# Patient Record
Sex: FEMALE | Race: OTHER | HISPANIC OR LATINO | URBAN - METROPOLITAN AREA
[De-identification: names, ages, dates, MRNs, and addresses within clinical notes are randomized per-mention and may not be internally consistent; named-entity substitution may affect disease eponyms.]

---

## 2017-01-04 ENCOUNTER — EMERGENCY (EMERGENCY)
Facility: HOSPITAL | Age: 57
LOS: 1 days | Discharge: HOME | End: 2017-01-04
Attending: EMERGENCY MEDICINE
Payer: COMMERCIAL

## 2017-01-04 VITALS
HEART RATE: 99 BPM | RESPIRATION RATE: 18 BRPM | TEMPERATURE: 99 F | OXYGEN SATURATION: 97 % | SYSTOLIC BLOOD PRESSURE: 106 MMHG | DIASTOLIC BLOOD PRESSURE: 51 MMHG

## 2017-01-04 VITALS
WEIGHT: 139.99 LBS | SYSTOLIC BLOOD PRESSURE: 136 MMHG | DIASTOLIC BLOOD PRESSURE: 53 MMHG | OXYGEN SATURATION: 99 % | HEIGHT: 61 IN | TEMPERATURE: 102 F | RESPIRATION RATE: 18 BRPM | HEART RATE: 118 BPM

## 2017-01-04 LAB
ALBUMIN SERPL ELPH-MCNC: 3.5 G/DL — SIGNIFICANT CHANGE UP (ref 3.5–5)
ALP SERPL-CCNC: 155 U/L — HIGH (ref 40–120)
ALT FLD-CCNC: 136 U/L DA — HIGH (ref 10–60)
ANION GAP SERPL CALC-SCNC: 9 MMOL/L — SIGNIFICANT CHANGE UP (ref 5–17)
AST SERPL-CCNC: 78 U/L — HIGH (ref 10–40)
BASOPHILS # BLD AUTO: 0.1 K/UL — SIGNIFICANT CHANGE UP (ref 0–0.2)
BASOPHILS NFR BLD AUTO: 0.9 % — SIGNIFICANT CHANGE UP (ref 0–2)
BILIRUB SERPL-MCNC: 0.6 MG/DL — SIGNIFICANT CHANGE UP (ref 0.2–1.2)
BUN SERPL-MCNC: 9 MG/DL — SIGNIFICANT CHANGE UP (ref 7–18)
CALCIUM SERPL-MCNC: 8.8 MG/DL — SIGNIFICANT CHANGE UP (ref 8.4–10.5)
CHLORIDE SERPL-SCNC: 104 MMOL/L — SIGNIFICANT CHANGE UP (ref 96–108)
CO2 SERPL-SCNC: 23 MMOL/L — SIGNIFICANT CHANGE UP (ref 22–31)
CREAT SERPL-MCNC: 0.78 MG/DL — SIGNIFICANT CHANGE UP (ref 0.5–1.3)
EOSINOPHIL # BLD AUTO: 0 K/UL — SIGNIFICANT CHANGE UP (ref 0–0.5)
EOSINOPHIL NFR BLD AUTO: 0 % — SIGNIFICANT CHANGE UP (ref 0–6)
GLUCOSE SERPL-MCNC: 134 MG/DL — HIGH (ref 70–99)
HCT VFR BLD CALC: 38 % — SIGNIFICANT CHANGE UP (ref 34.5–45)
HGB BLD-MCNC: 12.8 G/DL — SIGNIFICANT CHANGE UP (ref 11.5–15.5)
LYMPHOCYTES # BLD AUTO: 1.1 K/UL — SIGNIFICANT CHANGE UP (ref 1–3.3)
LYMPHOCYTES # BLD AUTO: 9.8 % — LOW (ref 13–44)
MCHC RBC-ENTMCNC: 29.3 PG — SIGNIFICANT CHANGE UP (ref 27–34)
MCHC RBC-ENTMCNC: 33.6 GM/DL — SIGNIFICANT CHANGE UP (ref 32–36)
MCV RBC AUTO: 87 FL — SIGNIFICANT CHANGE UP (ref 80–100)
MONOCYTES # BLD AUTO: 0.8 K/UL — SIGNIFICANT CHANGE UP (ref 0–0.9)
MONOCYTES NFR BLD AUTO: 6.9 % — SIGNIFICANT CHANGE UP (ref 2–14)
NEUTROPHILS # BLD AUTO: 9.1 K/UL — HIGH (ref 1.8–7.4)
NEUTROPHILS NFR BLD AUTO: 82.4 % — HIGH (ref 43–77)
PLATELET # BLD AUTO: 291 K/UL — SIGNIFICANT CHANGE UP (ref 150–400)
POTASSIUM SERPL-MCNC: 3.3 MMOL/L — LOW (ref 3.5–5.3)
POTASSIUM SERPL-SCNC: 3.3 MMOL/L — LOW (ref 3.5–5.3)
PROT SERPL-MCNC: 7.9 G/DL — SIGNIFICANT CHANGE UP (ref 6–8.3)
RBC # BLD: 4.36 M/UL — SIGNIFICANT CHANGE UP (ref 3.8–5.2)
RBC # FLD: 12 % — SIGNIFICANT CHANGE UP (ref 10.3–14.5)
SODIUM SERPL-SCNC: 136 MMOL/L — SIGNIFICANT CHANGE UP (ref 135–145)
WBC # BLD: 11.1 K/UL — HIGH (ref 3.8–10.5)
WBC # FLD AUTO: 11.1 K/UL — HIGH (ref 3.8–10.5)

## 2017-01-04 PROCEDURE — 80053 COMPREHEN METABOLIC PANEL: CPT

## 2017-01-04 PROCEDURE — 71046 X-RAY EXAM CHEST 2 VIEWS: CPT

## 2017-01-04 PROCEDURE — 99285 EMERGENCY DEPT VISIT HI MDM: CPT

## 2017-01-04 PROCEDURE — 71020: CPT | Mod: 26

## 2017-01-04 PROCEDURE — 96374 THER/PROPH/DIAG INJ IV PUSH: CPT

## 2017-01-04 PROCEDURE — 99284 EMERGENCY DEPT VISIT MOD MDM: CPT | Mod: 25

## 2017-01-04 PROCEDURE — 85027 COMPLETE CBC AUTOMATED: CPT

## 2017-01-04 RX ORDER — KETOROLAC TROMETHAMINE 30 MG/ML
15 SYRINGE (ML) INJECTION ONCE
Qty: 0 | Refills: 0 | Status: DISCONTINUED | OUTPATIENT
Start: 2017-01-04 | End: 2017-01-04

## 2017-01-04 RX ORDER — ONDANSETRON 8 MG/1
1 TABLET, FILM COATED ORAL
Qty: 6 | Refills: 0
Start: 2017-01-04 | End: 2017-03-25

## 2017-01-04 RX ORDER — ONDANSETRON 8 MG/1
1 TABLET, FILM COATED ORAL
Qty: 6 | Refills: 0 | OUTPATIENT
Start: 2017-01-04 | End: 2017-01-06

## 2017-01-04 RX ORDER — ACETAMINOPHEN 500 MG
650 TABLET ORAL ONCE
Qty: 0 | Refills: 0 | Status: COMPLETED | OUTPATIENT
Start: 2017-01-04 | End: 2017-01-04

## 2017-01-04 RX ORDER — POTASSIUM CHLORIDE 20 MEQ
40 PACKET (EA) ORAL ONCE
Qty: 0 | Refills: 0 | Status: COMPLETED | OUTPATIENT
Start: 2017-01-04 | End: 2017-01-04

## 2017-01-04 RX ORDER — SODIUM CHLORIDE 9 MG/ML
1000 INJECTION INTRAMUSCULAR; INTRAVENOUS; SUBCUTANEOUS ONCE
Qty: 0 | Refills: 0 | Status: COMPLETED | OUTPATIENT
Start: 2017-01-04 | End: 2017-01-04

## 2017-01-04 RX ADMIN — Medication 15 MILLIGRAM(S): at 14:52

## 2017-01-04 RX ADMIN — Medication 15 MILLIGRAM(S): at 16:21

## 2017-01-04 RX ADMIN — Medication 75 MILLIGRAM(S): at 16:21

## 2017-01-04 RX ADMIN — Medication 40 MILLIEQUIVALENT(S): at 16:21

## 2017-01-04 RX ADMIN — Medication 650 MILLIGRAM(S): at 14:52

## 2017-01-04 RX ADMIN — SODIUM CHLORIDE 2000 MILLILITER(S): 9 INJECTION INTRAMUSCULAR; INTRAVENOUS; SUBCUTANEOUS at 14:53

## 2017-01-04 NOTE — ED PROVIDER NOTE - PROGRESS NOTE DETAILS
Pt and family members were informed likely flu. Pt was given tamiflu in ED and was stable to be discharge

## 2017-01-04 NOTE — ED PROVIDER NOTE - OBJECTIVE STATEMENT
57 y/o F pt with no significant PMHx presents to the ED c/o fever x yesterday. Pt also notes body aches. Pt  denies any other complaints at this time. NKDA.

## 2017-01-04 NOTE — ED PROVIDER NOTE - MEDICAL DECISION MAKING DETAILS
Pt presents to ED with fever and general malaise. Possible flu. Plan to obtain labs, treat with fluids and Tamiflu.

## 2017-01-08 DIAGNOSIS — J11.1 INFLUENZA DUE TO UNIDENTIFIED INFLUENZA VIRUS WITH OTHER RESPIRATORY MANIFESTATIONS: ICD-10-CM

## 2017-02-28 ENCOUNTER — EMERGENCY (EMERGENCY)
Facility: HOSPITAL | Age: 57
LOS: 1 days | Discharge: HOME | End: 2017-02-28
Attending: EMERGENCY MEDICINE
Payer: COMMERCIAL

## 2017-02-28 VITALS
DIASTOLIC BLOOD PRESSURE: 62 MMHG | OXYGEN SATURATION: 98 % | RESPIRATION RATE: 18 BRPM | TEMPERATURE: 98 F | SYSTOLIC BLOOD PRESSURE: 100 MMHG | HEART RATE: 81 BPM

## 2017-02-28 VITALS
OXYGEN SATURATION: 96 % | HEIGHT: 62 IN | RESPIRATION RATE: 18 BRPM | SYSTOLIC BLOOD PRESSURE: 126 MMHG | WEIGHT: 145.06 LBS | TEMPERATURE: 98 F | DIASTOLIC BLOOD PRESSURE: 64 MMHG | HEART RATE: 106 BPM

## 2017-02-28 DIAGNOSIS — K57.32 DIVERTICULITIS OF LARGE INTESTINE WITHOUT PERFORATION OR ABSCESS WITHOUT BLEEDING: ICD-10-CM

## 2017-02-28 DIAGNOSIS — Z90.49 ACQUIRED ABSENCE OF OTHER SPECIFIED PARTS OF DIGESTIVE TRACT: Chronic | ICD-10-CM

## 2017-02-28 DIAGNOSIS — Z98.890 OTHER SPECIFIED POSTPROCEDURAL STATES: ICD-10-CM

## 2017-02-28 DIAGNOSIS — Z90.49 ACQUIRED ABSENCE OF OTHER SPECIFIED PARTS OF DIGESTIVE TRACT: ICD-10-CM

## 2017-02-28 DIAGNOSIS — Z98.891 HISTORY OF UTERINE SCAR FROM PREVIOUS SURGERY: Chronic | ICD-10-CM

## 2017-02-28 LAB
ALBUMIN SERPL ELPH-MCNC: 3.6 G/DL — SIGNIFICANT CHANGE UP (ref 3.5–5)
ALLERGY+IMMUNOLOGY DIAG STUDY NOTE: SIGNIFICANT CHANGE UP
ALP SERPL-CCNC: 150 U/L — HIGH (ref 40–120)
ALT FLD-CCNC: 73 U/L DA — HIGH (ref 10–60)
ANION GAP SERPL CALC-SCNC: 9 MMOL/L — SIGNIFICANT CHANGE UP (ref 5–17)
AST SERPL-CCNC: 57 U/L — HIGH (ref 10–40)
BASOPHILS # BLD AUTO: 0.1 K/UL — SIGNIFICANT CHANGE UP (ref 0–0.2)
BASOPHILS NFR BLD AUTO: 1.3 % — SIGNIFICANT CHANGE UP (ref 0–2)
BILIRUB SERPL-MCNC: 0.9 MG/DL — SIGNIFICANT CHANGE UP (ref 0.2–1.2)
BUN SERPL-MCNC: 10 MG/DL — SIGNIFICANT CHANGE UP (ref 7–18)
CALCIUM SERPL-MCNC: 8.6 MG/DL — SIGNIFICANT CHANGE UP (ref 8.4–10.5)
CHLORIDE SERPL-SCNC: 102 MMOL/L — SIGNIFICANT CHANGE UP (ref 96–108)
CO2 SERPL-SCNC: 27 MMOL/L — SIGNIFICANT CHANGE UP (ref 22–31)
CREAT SERPL-MCNC: 0.73 MG/DL — SIGNIFICANT CHANGE UP (ref 0.5–1.3)
EOSINOPHIL # BLD AUTO: 0.1 K/UL — SIGNIFICANT CHANGE UP (ref 0–0.5)
EOSINOPHIL NFR BLD AUTO: 0.6 % — SIGNIFICANT CHANGE UP (ref 0–6)
GLUCOSE SERPL-MCNC: 100 MG/DL — HIGH (ref 70–99)
HCG SERPL-ACNC: <1 MIU/ML — SIGNIFICANT CHANGE UP
HCG UR QL: NEGATIVE — SIGNIFICANT CHANGE UP
HCT VFR BLD CALC: 38.3 % — SIGNIFICANT CHANGE UP (ref 34.5–45)
HGB BLD-MCNC: 13.1 G/DL — SIGNIFICANT CHANGE UP (ref 11.5–15.5)
LACTATE SERPL-SCNC: 0.7 MMOL/L — SIGNIFICANT CHANGE UP (ref 0.7–2)
LIDOCAIN IGE QN: 104 U/L — SIGNIFICANT CHANGE UP (ref 73–393)
LYMPHOCYTES # BLD AUTO: 2.3 K/UL — SIGNIFICANT CHANGE UP (ref 1–3.3)
LYMPHOCYTES # BLD AUTO: 21.9 % — SIGNIFICANT CHANGE UP (ref 13–44)
MCHC RBC-ENTMCNC: 29.7 PG — SIGNIFICANT CHANGE UP (ref 27–34)
MCHC RBC-ENTMCNC: 34.2 GM/DL — SIGNIFICANT CHANGE UP (ref 32–36)
MCV RBC AUTO: 87 FL — SIGNIFICANT CHANGE UP (ref 80–100)
MONOCYTES # BLD AUTO: 1 K/UL — HIGH (ref 0–0.9)
MONOCYTES NFR BLD AUTO: 9.1 % — SIGNIFICANT CHANGE UP (ref 2–14)
NEUTROPHILS # BLD AUTO: 7.2 K/UL — SIGNIFICANT CHANGE UP (ref 1.8–7.4)
NEUTROPHILS NFR BLD AUTO: 67.2 % — SIGNIFICANT CHANGE UP (ref 43–77)
PLATELET # BLD AUTO: 272 K/UL — SIGNIFICANT CHANGE UP (ref 150–400)
POTASSIUM SERPL-MCNC: 4 MMOL/L — SIGNIFICANT CHANGE UP (ref 3.5–5.3)
POTASSIUM SERPL-SCNC: 4 MMOL/L — SIGNIFICANT CHANGE UP (ref 3.5–5.3)
PROT SERPL-MCNC: 7.6 G/DL — SIGNIFICANT CHANGE UP (ref 6–8.3)
RBC # BLD: 4.4 M/UL — SIGNIFICANT CHANGE UP (ref 3.8–5.2)
RBC # FLD: 12.4 % — SIGNIFICANT CHANGE UP (ref 10.3–14.5)
SODIUM SERPL-SCNC: 138 MMOL/L — SIGNIFICANT CHANGE UP (ref 135–145)
WBC # BLD: 10.7 K/UL — HIGH (ref 3.8–10.5)
WBC # FLD AUTO: 10.7 K/UL — HIGH (ref 3.8–10.5)

## 2017-02-28 PROCEDURE — 99284 EMERGENCY DEPT VISIT MOD MDM: CPT | Mod: 25

## 2017-02-28 PROCEDURE — 86850 RBC ANTIBODY SCREEN: CPT

## 2017-02-28 PROCEDURE — 74177 CT ABD & PELVIS W/CONTRAST: CPT

## 2017-02-28 PROCEDURE — 85027 COMPLETE CBC AUTOMATED: CPT

## 2017-02-28 PROCEDURE — 74177 CT ABD & PELVIS W/CONTRAST: CPT | Mod: 26

## 2017-02-28 PROCEDURE — 80053 COMPREHEN METABOLIC PANEL: CPT

## 2017-02-28 PROCEDURE — 96374 THER/PROPH/DIAG INJ IV PUSH: CPT

## 2017-02-28 PROCEDURE — 84702 CHORIONIC GONADOTROPIN TEST: CPT

## 2017-02-28 PROCEDURE — 83690 ASSAY OF LIPASE: CPT

## 2017-02-28 PROCEDURE — 72192 CT PELVIS W/O DYE: CPT

## 2017-02-28 PROCEDURE — 83605 ASSAY OF LACTIC ACID: CPT

## 2017-02-28 PROCEDURE — 81025 URINE PREGNANCY TEST: CPT

## 2017-02-28 PROCEDURE — 99285 EMERGENCY DEPT VISIT HI MDM: CPT

## 2017-02-28 PROCEDURE — 86901 BLOOD TYPING SEROLOGIC RH(D): CPT

## 2017-02-28 PROCEDURE — 86900 BLOOD TYPING SEROLOGIC ABO: CPT

## 2017-02-28 RX ORDER — MOXIFLOXACIN HYDROCHLORIDE TABLETS, 400 MG 400 MG/1
1 TABLET, FILM COATED ORAL
Qty: 20 | Refills: 0
Start: 2017-02-28 | End: 2017-04-02

## 2017-02-28 RX ORDER — SODIUM CHLORIDE 9 MG/ML
1000 INJECTION INTRAMUSCULAR; INTRAVENOUS; SUBCUTANEOUS ONCE
Qty: 0 | Refills: 0 | Status: COMPLETED | OUTPATIENT
Start: 2017-02-28 | End: 2017-02-28

## 2017-02-28 RX ORDER — METRONIDAZOLE 500 MG
1 TABLET ORAL
Qty: 30 | Refills: 0 | OUTPATIENT
Start: 2017-02-28 | End: 2017-03-10

## 2017-02-28 RX ORDER — SODIUM CHLORIDE 9 MG/ML
1000 INJECTION INTRAMUSCULAR; INTRAVENOUS; SUBCUTANEOUS
Qty: 0 | Refills: 0 | Status: DISCONTINUED | OUTPATIENT
Start: 2017-02-28 | End: 2017-03-04

## 2017-02-28 RX ORDER — METRONIDAZOLE 500 MG
1 TABLET ORAL
Qty: 30 | Refills: 0
Start: 2017-02-28 | End: 2017-04-02

## 2017-02-28 RX ORDER — SODIUM CHLORIDE 9 MG/ML
3 INJECTION INTRAMUSCULAR; INTRAVENOUS; SUBCUTANEOUS ONCE
Qty: 0 | Refills: 0 | Status: COMPLETED | OUTPATIENT
Start: 2017-02-28 | End: 2017-02-28

## 2017-02-28 RX ORDER — KETOROLAC TROMETHAMINE 30 MG/ML
30 SYRINGE (ML) INJECTION ONCE
Qty: 0 | Refills: 0 | Status: DISCONTINUED | OUTPATIENT
Start: 2017-02-28 | End: 2017-02-28

## 2017-02-28 RX ORDER — CIPROFLOXACIN LACTATE 400MG/40ML
500 VIAL (ML) INTRAVENOUS ONCE
Qty: 0 | Refills: 0 | Status: COMPLETED | OUTPATIENT
Start: 2017-02-28 | End: 2017-02-28

## 2017-02-28 RX ORDER — METRONIDAZOLE 500 MG
500 TABLET ORAL ONCE
Qty: 0 | Refills: 0 | Status: COMPLETED | OUTPATIENT
Start: 2017-02-28 | End: 2017-02-28

## 2017-02-28 RX ORDER — MOXIFLOXACIN HYDROCHLORIDE TABLETS, 400 MG 400 MG/1
1 TABLET, FILM COATED ORAL
Qty: 20 | Refills: 0 | OUTPATIENT
Start: 2017-02-28 | End: 2017-03-10

## 2017-02-28 RX ADMIN — Medication 500 MILLIGRAM(S): at 21:02

## 2017-02-28 RX ADMIN — Medication 30 MILLIGRAM(S): at 17:18

## 2017-02-28 RX ADMIN — SODIUM CHLORIDE 3 MILLILITER(S): 9 INJECTION INTRAMUSCULAR; INTRAVENOUS; SUBCUTANEOUS at 17:15

## 2017-02-28 RX ADMIN — SODIUM CHLORIDE 125 MILLILITER(S): 9 INJECTION INTRAMUSCULAR; INTRAVENOUS; SUBCUTANEOUS at 17:18

## 2017-02-28 RX ADMIN — SODIUM CHLORIDE 1000 MILLILITER(S): 9 INJECTION INTRAMUSCULAR; INTRAVENOUS; SUBCUTANEOUS at 17:18

## 2017-02-28 RX ADMIN — Medication 30 MILLIGRAM(S): at 19:11

## 2017-02-28 RX ADMIN — SODIUM CHLORIDE 3 MILLILITER(S): 9 INJECTION INTRAMUSCULAR; INTRAVENOUS; SUBCUTANEOUS at 17:16

## 2017-02-28 NOTE — ED PROVIDER NOTE - OBJECTIVE STATEMENT
55 y/o M/F w/ PMHx of diverticulitis and PSHx of , cholecystectomy, and appendectomy p/w L lower abd pain, initally intermittent now constant, onset 4 days, worsening. Pt notes 1 episode of diarrhea yesterday resolved and nausea x4 days. Pt has taken Julia-Towson and Pepto-Bismol for Sx to mild relief. Pt denies fever, dysuria, hematuria, or any other complaint. NKDA. 55 y/o M/F w/ PMHx of diverticulosis and PSHx of , cholecystectomy, and appendectomy p/w L lower abd pain, initally intermittent now constant, onset 4 days, worsening. Pt notes 1 episode of diarrhea yesterday resolved and nausea x4 days. Pt has taken Julia-Sasser and Pepto-Bismol for Sx to mild relief. Pt denies fever, dysuria, hematuria, or any other complaint. NKDA.

## 2017-02-28 NOTE — ED PROVIDER NOTE - NS ED MD SCRIBE ATTENDING SCRIBE SECTIONS
PAST MEDICAL/SURGICAL/SOCIAL HISTORY/PHYSICAL EXAM/VITAL SIGNS( Pullset)/REVIEW OF SYSTEMS/HISTORY OF PRESENT ILLNESS/HIV

## 2017-03-23 ENCOUNTER — EMERGENCY (EMERGENCY)
Facility: HOSPITAL | Age: 57
LOS: 1 days | Discharge: HOME | End: 2017-03-23
Attending: EMERGENCY MEDICINE
Payer: COMMERCIAL

## 2017-03-23 VITALS — OXYGEN SATURATION: 96 % | RESPIRATION RATE: 20 BRPM | TEMPERATURE: 98 F | SYSTOLIC BLOOD PRESSURE: 131 MMHG

## 2017-03-23 VITALS
TEMPERATURE: 98 F | RESPIRATION RATE: 16 BRPM | WEIGHT: 138.01 LBS | DIASTOLIC BLOOD PRESSURE: 69 MMHG | HEART RATE: 84 BPM | HEIGHT: 62 IN | OXYGEN SATURATION: 100 % | SYSTOLIC BLOOD PRESSURE: 111 MMHG

## 2017-03-23 DIAGNOSIS — Z90.49 ACQUIRED ABSENCE OF OTHER SPECIFIED PARTS OF DIGESTIVE TRACT: Chronic | ICD-10-CM

## 2017-03-23 DIAGNOSIS — Z98.891 HISTORY OF UTERINE SCAR FROM PREVIOUS SURGERY: Chronic | ICD-10-CM

## 2017-03-23 LAB
ALBUMIN SERPL ELPH-MCNC: 3.5 G/DL — SIGNIFICANT CHANGE UP (ref 3.5–5)
ALP SERPL-CCNC: 127 U/L — HIGH (ref 40–120)
ALT FLD-CCNC: 81 U/L DA — HIGH (ref 10–60)
ANION GAP SERPL CALC-SCNC: 10 MMOL/L — SIGNIFICANT CHANGE UP (ref 5–17)
APPEARANCE UR: CLEAR — SIGNIFICANT CHANGE UP
AST SERPL-CCNC: 55 U/L — HIGH (ref 10–40)
BASOPHILS # BLD AUTO: 0.1 K/UL — SIGNIFICANT CHANGE UP (ref 0–0.2)
BASOPHILS NFR BLD AUTO: 1.1 % — SIGNIFICANT CHANGE UP (ref 0–2)
BILIRUB SERPL-MCNC: 0.6 MG/DL — SIGNIFICANT CHANGE UP (ref 0.2–1.2)
BILIRUB UR-MCNC: NEGATIVE — SIGNIFICANT CHANGE UP
BUN SERPL-MCNC: 10 MG/DL — SIGNIFICANT CHANGE UP (ref 7–18)
CALCIUM SERPL-MCNC: 8.8 MG/DL — SIGNIFICANT CHANGE UP (ref 8.4–10.5)
CHLORIDE SERPL-SCNC: 105 MMOL/L — SIGNIFICANT CHANGE UP (ref 96–108)
CO2 SERPL-SCNC: 25 MMOL/L — SIGNIFICANT CHANGE UP (ref 22–31)
COLOR SPEC: YELLOW — SIGNIFICANT CHANGE UP
CREAT SERPL-MCNC: 0.6 MG/DL — SIGNIFICANT CHANGE UP (ref 0.5–1.3)
DIFF PNL FLD: ABNORMAL
EOSINOPHIL # BLD AUTO: 0.1 K/UL — SIGNIFICANT CHANGE UP (ref 0–0.5)
EOSINOPHIL NFR BLD AUTO: 1.3 % — SIGNIFICANT CHANGE UP (ref 0–6)
GLUCOSE SERPL-MCNC: 86 MG/DL — SIGNIFICANT CHANGE UP (ref 70–99)
GLUCOSE UR QL: NEGATIVE — SIGNIFICANT CHANGE UP
HCG UR QL: NEGATIVE — SIGNIFICANT CHANGE UP
HCT VFR BLD CALC: 39.7 % — SIGNIFICANT CHANGE UP (ref 34.5–45)
HGB BLD-MCNC: 13.8 G/DL — SIGNIFICANT CHANGE UP (ref 11.5–15.5)
KETONES UR-MCNC: ABNORMAL
LACTATE SERPL-SCNC: 0.6 MMOL/L — LOW (ref 0.7–2)
LEUKOCYTE ESTERASE UR-ACNC: ABNORMAL
LIDOCAIN IGE QN: 141 U/L — SIGNIFICANT CHANGE UP (ref 73–393)
LYMPHOCYTES # BLD AUTO: 1.7 K/UL — SIGNIFICANT CHANGE UP (ref 1–3.3)
LYMPHOCYTES # BLD AUTO: 24.5 % — SIGNIFICANT CHANGE UP (ref 13–44)
MCHC RBC-ENTMCNC: 30 PG — SIGNIFICANT CHANGE UP (ref 27–34)
MCHC RBC-ENTMCNC: 34.8 GM/DL — SIGNIFICANT CHANGE UP (ref 32–36)
MCV RBC AUTO: 86.1 FL — SIGNIFICANT CHANGE UP (ref 80–100)
MONOCYTES # BLD AUTO: 0.8 K/UL — SIGNIFICANT CHANGE UP (ref 0–0.9)
MONOCYTES NFR BLD AUTO: 12.2 % — SIGNIFICANT CHANGE UP (ref 2–14)
NEUTROPHILS # BLD AUTO: 4.2 K/UL — SIGNIFICANT CHANGE UP (ref 1.8–7.4)
NEUTROPHILS NFR BLD AUTO: 60.9 % — SIGNIFICANT CHANGE UP (ref 43–77)
NITRITE UR-MCNC: POSITIVE
PH UR: 6 — SIGNIFICANT CHANGE UP (ref 4.8–8)
PLATELET # BLD AUTO: 236 K/UL — SIGNIFICANT CHANGE UP (ref 150–400)
POTASSIUM SERPL-MCNC: 3.6 MMOL/L — SIGNIFICANT CHANGE UP (ref 3.5–5.3)
POTASSIUM SERPL-SCNC: 3.6 MMOL/L — SIGNIFICANT CHANGE UP (ref 3.5–5.3)
PROT SERPL-MCNC: 7.5 G/DL — SIGNIFICANT CHANGE UP (ref 6–8.3)
PROT UR-MCNC: 30 MG/DL
RBC # BLD: 4.6 M/UL — SIGNIFICANT CHANGE UP (ref 3.8–5.2)
RBC # FLD: 12.6 % — SIGNIFICANT CHANGE UP (ref 10.3–14.5)
SODIUM SERPL-SCNC: 140 MMOL/L — SIGNIFICANT CHANGE UP (ref 135–145)
SP GR SPEC: 1.02 — SIGNIFICANT CHANGE UP (ref 1.01–1.02)
UROBILINOGEN FLD QL: 1
WBC # BLD: 6.9 K/UL — SIGNIFICANT CHANGE UP (ref 3.8–10.5)
WBC # FLD AUTO: 6.9 K/UL — SIGNIFICANT CHANGE UP (ref 3.8–10.5)

## 2017-03-23 PROCEDURE — 96375 TX/PRO/DX INJ NEW DRUG ADDON: CPT

## 2017-03-23 PROCEDURE — 83690 ASSAY OF LIPASE: CPT

## 2017-03-23 PROCEDURE — 81025 URINE PREGNANCY TEST: CPT

## 2017-03-23 PROCEDURE — 83605 ASSAY OF LACTIC ACID: CPT

## 2017-03-23 PROCEDURE — 80053 COMPREHEN METABOLIC PANEL: CPT

## 2017-03-23 PROCEDURE — 85027 COMPLETE CBC AUTOMATED: CPT

## 2017-03-23 PROCEDURE — 99284 EMERGENCY DEPT VISIT MOD MDM: CPT | Mod: 25

## 2017-03-23 PROCEDURE — 74177 CT ABD & PELVIS W/CONTRAST: CPT | Mod: 26

## 2017-03-23 PROCEDURE — 99285 EMERGENCY DEPT VISIT HI MDM: CPT

## 2017-03-23 PROCEDURE — 81001 URINALYSIS AUTO W/SCOPE: CPT

## 2017-03-23 PROCEDURE — 96374 THER/PROPH/DIAG INJ IV PUSH: CPT | Mod: 59

## 2017-03-23 PROCEDURE — 74177 CT ABD & PELVIS W/CONTRAST: CPT

## 2017-03-23 RX ORDER — ONDANSETRON 8 MG/1
4 TABLET, FILM COATED ORAL ONCE
Qty: 0 | Refills: 0 | Status: COMPLETED | OUTPATIENT
Start: 2017-03-23 | End: 2017-03-23

## 2017-03-23 RX ORDER — OXYCODONE HYDROCHLORIDE 5 MG/1
1 TABLET ORAL
Qty: 15 | Refills: 0
Start: 2017-03-23 | End: 2017-03-28

## 2017-03-23 RX ORDER — MORPHINE SULFATE 50 MG/1
4 CAPSULE, EXTENDED RELEASE ORAL ONCE
Qty: 0 | Refills: 0 | Status: DISCONTINUED | OUTPATIENT
Start: 2017-03-23 | End: 2017-03-23

## 2017-03-23 RX ORDER — SODIUM CHLORIDE 9 MG/ML
1000 INJECTION INTRAMUSCULAR; INTRAVENOUS; SUBCUTANEOUS ONCE
Qty: 0 | Refills: 0 | Status: COMPLETED | OUTPATIENT
Start: 2017-03-23 | End: 2017-03-23

## 2017-03-23 RX ORDER — MORPHINE SULFATE 50 MG/1
2 CAPSULE, EXTENDED RELEASE ORAL ONCE
Qty: 0 | Refills: 0 | Status: DISCONTINUED | OUTPATIENT
Start: 2017-03-23 | End: 2017-03-23

## 2017-03-23 RX ORDER — METRONIDAZOLE 500 MG
500 TABLET ORAL ONCE
Qty: 0 | Refills: 0 | Status: COMPLETED | OUTPATIENT
Start: 2017-03-23 | End: 2017-03-23

## 2017-03-23 RX ADMIN — MORPHINE SULFATE 2 MILLIGRAM(S): 50 CAPSULE, EXTENDED RELEASE ORAL at 21:00

## 2017-03-23 RX ADMIN — MORPHINE SULFATE 2 MILLIGRAM(S): 50 CAPSULE, EXTENDED RELEASE ORAL at 19:34

## 2017-03-23 RX ADMIN — SODIUM CHLORIDE 1000 MILLILITER(S): 9 INJECTION INTRAMUSCULAR; INTRAVENOUS; SUBCUTANEOUS at 17:29

## 2017-03-23 RX ADMIN — ONDANSETRON 4 MILLIGRAM(S): 8 TABLET, FILM COATED ORAL at 17:28

## 2017-03-23 RX ADMIN — Medication 500 MILLIGRAM(S): at 19:34

## 2017-03-23 NOTE — ED PROVIDER NOTE - OBJECTIVE STATEMENT
57 y/o female with PMHx of Diverticulitis and PSHx of Appendectomy, Cholecystectomy and  presents to the ED for diarrhea and LLQ pain today. On 17, pt was seen in the ED for LLQ pain and was Dx with diverticulitis with a CT. Pt was Rx Cipro and Flagyl to which pt noted relief in Sx for a few days, but since 4 days ago pt developed generalized body aches, lower extremities weakness, fever (Tm: 102F), LLQ pain and diarrhea. Pt took Aleve to moderate relief yesterday. Pt denies cough, burning with urination, or any other complaints. NKDA.

## 2017-03-23 NOTE — ED PROVIDER NOTE - MEDICAL DECISION MAKING DETAILS
Pt with LLQ pain and diarrhea x 4 days. Will check labs, repeat oral and IV contrasts, possible complication of diverticulitis.

## 2017-03-23 NOTE — ED PROVIDER NOTE - NS ED MD SCRIBE ATTENDING SCRIBE SECTIONS
PHYSICAL EXAM/REVIEW OF SYSTEMS/PAST MEDICAL/SURGICAL/SOCIAL HISTORY/HIV/VITAL SIGNS( Pullset)/HISTORY OF PRESENT ILLNESS/DISPOSITION

## 2017-03-27 DIAGNOSIS — K57.92 DIVERTICULITIS OF INTESTINE, PART UNSPECIFIED, WITHOUT PERFORATION OR ABSCESS WITHOUT BLEEDING: ICD-10-CM

## 2017-03-27 DIAGNOSIS — Z90.49 ACQUIRED ABSENCE OF OTHER SPECIFIED PARTS OF DIGESTIVE TRACT: ICD-10-CM

## 2018-04-18 ENCOUNTER — APPOINTMENT (OUTPATIENT)
Dept: OTOLARYNGOLOGY | Facility: CLINIC | Age: 58
End: 2018-04-18
Payer: COMMERCIAL

## 2018-04-18 VITALS
BODY MASS INDEX: 24.92 KG/M2 | WEIGHT: 132 LBS | HEIGHT: 61 IN | SYSTOLIC BLOOD PRESSURE: 110 MMHG | DIASTOLIC BLOOD PRESSURE: 75 MMHG | HEART RATE: 72 BPM

## 2018-04-18 DIAGNOSIS — Z86.39 PERSONAL HISTORY OF OTHER ENDOCRINE, NUTRITIONAL AND METABOLIC DISEASE: ICD-10-CM

## 2018-04-18 PROCEDURE — 99214 OFFICE O/P EST MOD 30 MIN: CPT

## 2018-04-18 RX ORDER — METHYLPREDNISOLONE 4 MG/1
4 TABLET ORAL
Qty: 21 | Refills: 0 | Status: ACTIVE | COMMUNITY
Start: 2018-04-18 | End: 1900-01-01

## 2018-04-18 RX ORDER — ETANERCEPT 50 MG/ML
50 SOLUTION SUBCUTANEOUS
Refills: 0 | Status: ACTIVE | COMMUNITY

## 2018-04-18 RX ORDER — LEVOTHYROXINE SODIUM 175 UG/1
175 TABLET ORAL
Refills: 0 | Status: ACTIVE | COMMUNITY

## 2018-04-18 RX ORDER — NEOMYCIN AND POLYMYXIN B SULFATES AND HYDROCORTISONE OTIC 10; 3.5; 1 MG/ML; MG/ML; [USP'U]/ML
3.5-10000-1 SUSPENSION AURICULAR (OTIC)
Qty: 10 | Refills: 5 | Status: ACTIVE | COMMUNITY
Start: 2018-04-18 | End: 1900-01-01

## 2018-04-18 RX ORDER — AMOXICILLIN AND CLAVULANATE POTASSIUM 875; 125 MG/1; MG/1
875-125 TABLET, COATED ORAL
Refills: 0 | Status: ACTIVE | COMMUNITY

## 2018-05-17 ENCOUNTER — APPOINTMENT (OUTPATIENT)
Dept: OTOLARYNGOLOGY | Facility: CLINIC | Age: 58
End: 2018-05-17
Payer: COMMERCIAL

## 2018-05-17 VITALS
HEIGHT: 61 IN | BODY MASS INDEX: 25.3 KG/M2 | DIASTOLIC BLOOD PRESSURE: 81 MMHG | SYSTOLIC BLOOD PRESSURE: 125 MMHG | HEART RATE: 66 BPM | WEIGHT: 134 LBS

## 2018-05-17 PROCEDURE — 92504 EAR MICROSCOPY EXAMINATION: CPT | Mod: 59

## 2018-05-17 PROCEDURE — 99213 OFFICE O/P EST LOW 20 MIN: CPT | Mod: 25

## 2018-05-17 PROCEDURE — 69220 CLEAN OUT MASTOID CAVITY: CPT | Mod: RT

## 2018-05-24 ENCOUNTER — APPOINTMENT (OUTPATIENT)
Dept: OTOLARYNGOLOGY | Facility: CLINIC | Age: 58
End: 2018-05-24

## 2018-06-25 NOTE — ED PROVIDER NOTE - NS ED MD SCRIBE ATTENDING SCRIBE SECTIONS
The patient is a 31y Female complaining of syncope. HIV/HISTORY OF PRESENT ILLNESS/DISPOSITION/PHYSICAL EXAM/PAST MEDICAL/SURGICAL/SOCIAL HISTORY/REVIEW OF SYSTEMS/VITAL SIGNS( Pullset)

## 2019-02-05 ENCOUNTER — EMERGENCY (EMERGENCY)
Facility: HOSPITAL | Age: 59
LOS: 1 days | Discharge: HOME | End: 2019-02-05
Attending: EMERGENCY MEDICINE
Payer: COMMERCIAL

## 2019-02-05 VITALS
OXYGEN SATURATION: 97 % | TEMPERATURE: 98 F | HEART RATE: 69 BPM | WEIGHT: 130.07 LBS | RESPIRATION RATE: 18 BRPM | HEIGHT: 60 IN | SYSTOLIC BLOOD PRESSURE: 130 MMHG | DIASTOLIC BLOOD PRESSURE: 80 MMHG

## 2019-02-05 DIAGNOSIS — Z90.49 ACQUIRED ABSENCE OF OTHER SPECIFIED PARTS OF DIGESTIVE TRACT: Chronic | ICD-10-CM

## 2019-02-05 DIAGNOSIS — Z98.891 HISTORY OF UTERINE SCAR FROM PREVIOUS SURGERY: Chronic | ICD-10-CM

## 2019-02-05 PROBLEM — K57.92 DIVERTICULITIS OF INTESTINE, PART UNSPECIFIED, WITHOUT PERFORATION OR ABSCESS WITHOUT BLEEDING: Chronic | Status: ACTIVE | Noted: 2017-02-28

## 2019-02-05 LAB
ALBUMIN SERPL ELPH-MCNC: 4 G/DL — SIGNIFICANT CHANGE UP (ref 3.5–5)
ALP SERPL-CCNC: 111 U/L — SIGNIFICANT CHANGE UP (ref 40–120)
ALT FLD-CCNC: 42 U/L DA — SIGNIFICANT CHANGE UP (ref 10–60)
ANION GAP SERPL CALC-SCNC: 6 MMOL/L — SIGNIFICANT CHANGE UP (ref 5–17)
APPEARANCE UR: CLEAR — SIGNIFICANT CHANGE UP
AST SERPL-CCNC: 46 U/L — HIGH (ref 10–40)
BASOPHILS # BLD AUTO: 0.1 K/UL — SIGNIFICANT CHANGE UP (ref 0–0.2)
BASOPHILS NFR BLD AUTO: 1.7 % — SIGNIFICANT CHANGE UP (ref 0–2)
BILIRUB SERPL-MCNC: 0.9 MG/DL — SIGNIFICANT CHANGE UP (ref 0.2–1.2)
BILIRUB UR-MCNC: NEGATIVE — SIGNIFICANT CHANGE UP
BUN SERPL-MCNC: 13 MG/DL — SIGNIFICANT CHANGE UP (ref 7–18)
CALCIUM SERPL-MCNC: 9.1 MG/DL — SIGNIFICANT CHANGE UP (ref 8.4–10.5)
CHLORIDE SERPL-SCNC: 106 MMOL/L — SIGNIFICANT CHANGE UP (ref 96–108)
CO2 SERPL-SCNC: 26 MMOL/L — SIGNIFICANT CHANGE UP (ref 22–31)
COLOR SPEC: YELLOW — SIGNIFICANT CHANGE UP
CREAT SERPL-MCNC: 0.69 MG/DL — SIGNIFICANT CHANGE UP (ref 0.5–1.3)
DIFF PNL FLD: NEGATIVE — SIGNIFICANT CHANGE UP
EOSINOPHIL # BLD AUTO: 0 K/UL — SIGNIFICANT CHANGE UP (ref 0–0.5)
EOSINOPHIL NFR BLD AUTO: 0.7 % — SIGNIFICANT CHANGE UP (ref 0–6)
GLUCOSE SERPL-MCNC: 94 MG/DL — SIGNIFICANT CHANGE UP (ref 70–99)
GLUCOSE UR QL: NEGATIVE — SIGNIFICANT CHANGE UP
HCT VFR BLD CALC: 41.2 % — SIGNIFICANT CHANGE UP (ref 34.5–45)
HGB BLD-MCNC: 13.5 G/DL — SIGNIFICANT CHANGE UP (ref 11.5–15.5)
KETONES UR-MCNC: ABNORMAL
LEUKOCYTE ESTERASE UR-ACNC: ABNORMAL
LIDOCAIN IGE QN: 121 U/L — SIGNIFICANT CHANGE UP (ref 73–393)
LYMPHOCYTES # BLD AUTO: 2.6 K/UL — SIGNIFICANT CHANGE UP (ref 1–3.3)
LYMPHOCYTES # BLD AUTO: 51.2 % — HIGH (ref 13–44)
MCHC RBC-ENTMCNC: 28.7 PG — SIGNIFICANT CHANGE UP (ref 27–34)
MCHC RBC-ENTMCNC: 32.7 GM/DL — SIGNIFICANT CHANGE UP (ref 32–36)
MCV RBC AUTO: 87.8 FL — SIGNIFICANT CHANGE UP (ref 80–100)
MONOCYTES # BLD AUTO: 0.4 K/UL — SIGNIFICANT CHANGE UP (ref 0–0.9)
MONOCYTES NFR BLD AUTO: 8.6 % — SIGNIFICANT CHANGE UP (ref 2–14)
NEUTROPHILS # BLD AUTO: 1.9 K/UL — SIGNIFICANT CHANGE UP (ref 1.8–7.4)
NEUTROPHILS NFR BLD AUTO: 37.8 % — LOW (ref 43–77)
NITRITE UR-MCNC: NEGATIVE — SIGNIFICANT CHANGE UP
PH UR: 7 — SIGNIFICANT CHANGE UP (ref 5–8)
PLATELET # BLD AUTO: 280 K/UL — SIGNIFICANT CHANGE UP (ref 150–400)
POTASSIUM SERPL-MCNC: 4.7 MMOL/L — SIGNIFICANT CHANGE UP (ref 3.5–5.3)
POTASSIUM SERPL-SCNC: 4.7 MMOL/L — SIGNIFICANT CHANGE UP (ref 3.5–5.3)
PROT SERPL-MCNC: 7.9 G/DL — SIGNIFICANT CHANGE UP (ref 6–8.3)
PROT UR-MCNC: 15
RBC # BLD: 4.7 M/UL — SIGNIFICANT CHANGE UP (ref 3.8–5.2)
RBC # FLD: 12.4 % — SIGNIFICANT CHANGE UP (ref 10.3–14.5)
SODIUM SERPL-SCNC: 138 MMOL/L — SIGNIFICANT CHANGE UP (ref 135–145)
SP GR SPEC: 1.01 — SIGNIFICANT CHANGE UP (ref 1.01–1.02)
UROBILINOGEN FLD QL: 1
WBC # BLD: 5.1 K/UL — SIGNIFICANT CHANGE UP (ref 3.8–10.5)
WBC # FLD AUTO: 5.1 K/UL — SIGNIFICANT CHANGE UP (ref 3.8–10.5)

## 2019-02-05 PROCEDURE — 36415 COLL VENOUS BLD VENIPUNCTURE: CPT

## 2019-02-05 PROCEDURE — 96374 THER/PROPH/DIAG INJ IV PUSH: CPT

## 2019-02-05 PROCEDURE — 81001 URINALYSIS AUTO W/SCOPE: CPT

## 2019-02-05 PROCEDURE — 85027 COMPLETE CBC AUTOMATED: CPT

## 2019-02-05 PROCEDURE — 99284 EMERGENCY DEPT VISIT MOD MDM: CPT

## 2019-02-05 PROCEDURE — 80053 COMPREHEN METABOLIC PANEL: CPT

## 2019-02-05 PROCEDURE — 83690 ASSAY OF LIPASE: CPT

## 2019-02-05 PROCEDURE — 99284 EMERGENCY DEPT VISIT MOD MDM: CPT | Mod: 25

## 2019-02-05 PROCEDURE — 96375 TX/PRO/DX INJ NEW DRUG ADDON: CPT

## 2019-02-05 RX ORDER — FAMOTIDINE 10 MG/ML
1 INJECTION INTRAVENOUS
Qty: 14 | Refills: 0
Start: 2019-02-05 | End: 2019-02-11

## 2019-02-05 RX ORDER — METOCLOPRAMIDE HCL 10 MG
10 TABLET ORAL ONCE
Qty: 0 | Refills: 0 | Status: COMPLETED | OUTPATIENT
Start: 2019-02-05 | End: 2019-02-05

## 2019-02-05 RX ORDER — FAMOTIDINE 10 MG/ML
20 INJECTION INTRAVENOUS ONCE
Qty: 0 | Refills: 0 | Status: COMPLETED | OUTPATIENT
Start: 2019-02-05 | End: 2019-02-05

## 2019-02-05 RX ORDER — SODIUM CHLORIDE 9 MG/ML
1000 INJECTION INTRAMUSCULAR; INTRAVENOUS; SUBCUTANEOUS ONCE
Qty: 0 | Refills: 0 | Status: COMPLETED | OUTPATIENT
Start: 2019-02-05 | End: 2019-02-05

## 2019-02-05 RX ORDER — ONDANSETRON 8 MG/1
1 TABLET, FILM COATED ORAL
Qty: 12 | Refills: 0
Start: 2019-02-05 | End: 2019-02-08

## 2019-02-05 RX ADMIN — Medication 10 MILLIGRAM(S): at 16:03

## 2019-02-05 RX ADMIN — FAMOTIDINE 20 MILLIGRAM(S): 10 INJECTION INTRAVENOUS at 16:03

## 2019-02-05 RX ADMIN — SODIUM CHLORIDE 1000 MILLILITER(S): 9 INJECTION INTRAMUSCULAR; INTRAVENOUS; SUBCUTANEOUS at 16:03

## 2019-02-05 RX ADMIN — SODIUM CHLORIDE 1000 MILLILITER(S): 9 INJECTION INTRAMUSCULAR; INTRAVENOUS; SUBCUTANEOUS at 18:01

## 2019-02-05 NOTE — ED ADULT NURSE NOTE - OBJECTIVE STATEMENT
pt complains of n/v since Sunday. pt says she was unable to keep food and water down since Sunday. pt says she has generalized body aches and headache. pt says her last period was 4 years ago. Denies diarrhea and denies pain during urination.

## 2019-02-05 NOTE — ED PROVIDER NOTE - PROGRESS NOTE DETAILS
Labs re-assuring. Tolerated PO. Unlikely GI bleed but informed patient that if coffee ground vomiting, pain or new or worsening symptoms or if concerned to come back ot the ED. Will gvie Rx for Pepcid and Zofran. Pt is well appearing walking with steady gait, stable for discharge and follow up without fail with medical doctor. I had a detailed discussion with the patient and/or guardian regarding the historical points, exam findings, and any diagnostic results supporting the discharge diagnosis. Pt educated on care and need for follow up. Strict return instructions and red flag signs and symptoms discussed with patient. Questions answered. Pt shows understanding of discharge information and agrees to follow.

## 2019-02-05 NOTE — ED PROVIDER NOTE - MEDICAL DECISION MAKING DETAILS
57 y/o F pt well appearing, vital sign within normal limits and afebrile. Will check labs, urine, give IV fluids, medication and reassess.

## 2019-02-05 NOTE — ED PROVIDER NOTE - OBJECTIVE STATEMENT
57 y/o F pt with a PMHx of diverticulitis, osteoporosis, RA, and a significant PSHx of , appendectomy, cholecystectomy, presents to the ED with chief complaint of vomiting x 3 days. Patient reports gradual onset of nonbloody, nonbilious vomiting every time she eats. Patient noticed during the last couple days, a few times she vomited small amount of black diluted with clear fluids. Patient notes nausea associated with generalized mild headache and upper abdominal pain only when vomiting. Patient reports a normal endoscopy and normal colonoscopy 1.5 years ago. NKDA.

## 2019-02-05 NOTE — ED PROVIDER NOTE - ATTENDING CONTRIBUTION TO CARE
59 y/o female with vomiting.   PE: HEENT:  WNL  CV/LUNGS: WNL  abd: WNL  A/P:  vomiting;   labs/urine/ivf/ reassess.

## 2019-05-13 NOTE — ED PROVIDER NOTE - PROGRESS NOTE DETAILS
DISPLAY PLAN FREE TEXT Pt declines analgesics at this time. pt is well and nontoxic. CT/labs noted. no abscesss.  pt advised to f/u with GI this week for Ct results of pacreatic concerns as outpt.

## 2019-11-27 ENCOUNTER — EMERGENCY (EMERGENCY)
Facility: HOSPITAL | Age: 59
LOS: 1 days | Discharge: HOME | End: 2019-11-27
Attending: EMERGENCY MEDICINE
Payer: COMMERCIAL

## 2019-11-27 VITALS
OXYGEN SATURATION: 98 % | HEART RATE: 68 BPM | RESPIRATION RATE: 16 BRPM | DIASTOLIC BLOOD PRESSURE: 79 MMHG | SYSTOLIC BLOOD PRESSURE: 137 MMHG | TEMPERATURE: 98 F | WEIGHT: 138.01 LBS

## 2019-11-27 DIAGNOSIS — Z90.49 ACQUIRED ABSENCE OF OTHER SPECIFIED PARTS OF DIGESTIVE TRACT: Chronic | ICD-10-CM

## 2019-11-27 DIAGNOSIS — Z98.891 HISTORY OF UTERINE SCAR FROM PREVIOUS SURGERY: Chronic | ICD-10-CM

## 2019-11-27 LAB
ALBUMIN SERPL ELPH-MCNC: 3.6 G/DL — SIGNIFICANT CHANGE UP (ref 3.5–5)
ALP SERPL-CCNC: 172 U/L — HIGH (ref 40–120)
ALT FLD-CCNC: 168 U/L DA — HIGH (ref 10–60)
ANION GAP SERPL CALC-SCNC: 6 MMOL/L — SIGNIFICANT CHANGE UP (ref 5–17)
APPEARANCE UR: CLEAR — SIGNIFICANT CHANGE UP
AST SERPL-CCNC: 257 U/L — HIGH (ref 10–40)
BASOPHILS # BLD AUTO: 0.04 K/UL — SIGNIFICANT CHANGE UP (ref 0–0.2)
BASOPHILS NFR BLD AUTO: 0.5 % — SIGNIFICANT CHANGE UP (ref 0–2)
BILIRUB SERPL-MCNC: 0.5 MG/DL — SIGNIFICANT CHANGE UP (ref 0.2–1.2)
BILIRUB UR-MCNC: NEGATIVE — SIGNIFICANT CHANGE UP
BUN SERPL-MCNC: 8 MG/DL — SIGNIFICANT CHANGE UP (ref 7–18)
CALCIUM SERPL-MCNC: 8.7 MG/DL — SIGNIFICANT CHANGE UP (ref 8.4–10.5)
CHLORIDE SERPL-SCNC: 107 MMOL/L — SIGNIFICANT CHANGE UP (ref 96–108)
CO2 SERPL-SCNC: 28 MMOL/L — SIGNIFICANT CHANGE UP (ref 22–31)
COLOR SPEC: YELLOW — SIGNIFICANT CHANGE UP
CREAT SERPL-MCNC: 0.74 MG/DL — SIGNIFICANT CHANGE UP (ref 0.5–1.3)
DIFF PNL FLD: NEGATIVE — SIGNIFICANT CHANGE UP
EOSINOPHIL # BLD AUTO: 0.08 K/UL — SIGNIFICANT CHANGE UP (ref 0–0.5)
EOSINOPHIL NFR BLD AUTO: 1 % — SIGNIFICANT CHANGE UP (ref 0–6)
GLUCOSE SERPL-MCNC: 112 MG/DL — HIGH (ref 70–99)
GLUCOSE UR QL: NEGATIVE — SIGNIFICANT CHANGE UP
HCG SERPL-ACNC: 1 MIU/ML — SIGNIFICANT CHANGE UP
HCT VFR BLD CALC: 39.8 % — SIGNIFICANT CHANGE UP (ref 34.5–45)
HGB BLD-MCNC: 13 G/DL — SIGNIFICANT CHANGE UP (ref 11.5–15.5)
IMM GRANULOCYTES NFR BLD AUTO: 0.2 % — SIGNIFICANT CHANGE UP (ref 0–1.5)
KETONES UR-MCNC: NEGATIVE — SIGNIFICANT CHANGE UP
LEUKOCYTE ESTERASE UR-ACNC: ABNORMAL
LIDOCAIN IGE QN: 210 U/L — SIGNIFICANT CHANGE UP (ref 73–393)
LYMPHOCYTES # BLD AUTO: 3.34 K/UL — HIGH (ref 1–3.3)
LYMPHOCYTES # BLD AUTO: 39.9 % — SIGNIFICANT CHANGE UP (ref 13–44)
MCHC RBC-ENTMCNC: 28.9 PG — SIGNIFICANT CHANGE UP (ref 27–34)
MCHC RBC-ENTMCNC: 32.7 GM/DL — SIGNIFICANT CHANGE UP (ref 32–36)
MCV RBC AUTO: 88.4 FL — SIGNIFICANT CHANGE UP (ref 80–100)
MONOCYTES # BLD AUTO: 0.66 K/UL — SIGNIFICANT CHANGE UP (ref 0–0.9)
MONOCYTES NFR BLD AUTO: 7.9 % — SIGNIFICANT CHANGE UP (ref 2–14)
NEUTROPHILS # BLD AUTO: 4.24 K/UL — SIGNIFICANT CHANGE UP (ref 1.8–7.4)
NEUTROPHILS NFR BLD AUTO: 50.5 % — SIGNIFICANT CHANGE UP (ref 43–77)
NITRITE UR-MCNC: NEGATIVE — SIGNIFICANT CHANGE UP
NRBC # BLD: 0 /100 WBCS — SIGNIFICANT CHANGE UP (ref 0–0)
PH UR: 8 — SIGNIFICANT CHANGE UP (ref 5–8)
PLATELET # BLD AUTO: 322 K/UL — SIGNIFICANT CHANGE UP (ref 150–400)
POTASSIUM SERPL-MCNC: 3.7 MMOL/L — SIGNIFICANT CHANGE UP (ref 3.5–5.3)
POTASSIUM SERPL-SCNC: 3.7 MMOL/L — SIGNIFICANT CHANGE UP (ref 3.5–5.3)
PROT SERPL-MCNC: 7.3 G/DL — SIGNIFICANT CHANGE UP (ref 6–8.3)
PROT UR-MCNC: NEGATIVE — SIGNIFICANT CHANGE UP
RBC # BLD: 4.5 M/UL — SIGNIFICANT CHANGE UP (ref 3.8–5.2)
RBC # FLD: 12.6 % — SIGNIFICANT CHANGE UP (ref 10.3–14.5)
SODIUM SERPL-SCNC: 141 MMOL/L — SIGNIFICANT CHANGE UP (ref 135–145)
SP GR SPEC: 1.01 — SIGNIFICANT CHANGE UP (ref 1.01–1.02)
UROBILINOGEN FLD QL: 4
WBC # BLD: 8.38 K/UL — SIGNIFICANT CHANGE UP (ref 3.8–10.5)
WBC # FLD AUTO: 8.38 K/UL — SIGNIFICANT CHANGE UP (ref 3.8–10.5)

## 2019-11-27 PROCEDURE — 84702 CHORIONIC GONADOTROPIN TEST: CPT

## 2019-11-27 PROCEDURE — 36415 COLL VENOUS BLD VENIPUNCTURE: CPT

## 2019-11-27 PROCEDURE — 99284 EMERGENCY DEPT VISIT MOD MDM: CPT | Mod: 25

## 2019-11-27 PROCEDURE — 96375 TX/PRO/DX INJ NEW DRUG ADDON: CPT

## 2019-11-27 PROCEDURE — 85027 COMPLETE CBC AUTOMATED: CPT

## 2019-11-27 PROCEDURE — 80053 COMPREHEN METABOLIC PANEL: CPT

## 2019-11-27 PROCEDURE — 99283 EMERGENCY DEPT VISIT LOW MDM: CPT

## 2019-11-27 PROCEDURE — 96374 THER/PROPH/DIAG INJ IV PUSH: CPT | Mod: XU

## 2019-11-27 PROCEDURE — 74177 CT ABD & PELVIS W/CONTRAST: CPT

## 2019-11-27 PROCEDURE — 83690 ASSAY OF LIPASE: CPT

## 2019-11-27 PROCEDURE — 81001 URINALYSIS AUTO W/SCOPE: CPT

## 2019-11-27 PROCEDURE — 74177 CT ABD & PELVIS W/CONTRAST: CPT | Mod: 26

## 2019-11-27 RX ORDER — ACETAMINOPHEN 500 MG
1000 TABLET ORAL ONCE
Refills: 0 | Status: COMPLETED | OUTPATIENT
Start: 2019-11-27 | End: 2019-11-27

## 2019-11-27 RX ORDER — SODIUM CHLORIDE 9 MG/ML
1000 INJECTION INTRAMUSCULAR; INTRAVENOUS; SUBCUTANEOUS ONCE
Refills: 0 | Status: COMPLETED | OUTPATIENT
Start: 2019-11-27 | End: 2019-11-27

## 2019-11-27 RX ORDER — LIDOCAINE 4 G/100G
10 CREAM TOPICAL ONCE
Refills: 0 | Status: COMPLETED | OUTPATIENT
Start: 2019-11-27 | End: 2019-11-27

## 2019-11-27 RX ORDER — FAMOTIDINE 10 MG/ML
20 INJECTION INTRAVENOUS ONCE
Refills: 0 | Status: COMPLETED | OUTPATIENT
Start: 2019-11-27 | End: 2019-11-27

## 2019-11-27 RX ADMIN — Medication 30 MILLILITER(S): at 23:03

## 2019-11-27 RX ADMIN — FAMOTIDINE 20 MILLIGRAM(S): 10 INJECTION INTRAVENOUS at 23:03

## 2019-11-27 RX ADMIN — LIDOCAINE 10 MILLILITER(S): 4 CREAM TOPICAL at 23:03

## 2019-11-27 RX ADMIN — Medication 400 MILLIGRAM(S): at 23:02

## 2019-11-27 RX ADMIN — SODIUM CHLORIDE 1000 MILLILITER(S): 9 INJECTION INTRAMUSCULAR; INTRAVENOUS; SUBCUTANEOUS at 23:03

## 2019-11-28 VITALS
RESPIRATION RATE: 16 BRPM | OXYGEN SATURATION: 99 % | HEART RATE: 67 BPM | TEMPERATURE: 97 F | DIASTOLIC BLOOD PRESSURE: 58 MMHG | SYSTOLIC BLOOD PRESSURE: 136 MMHG

## 2019-11-28 PROBLEM — M81.0 AGE-RELATED OSTEOPOROSIS WITHOUT CURRENT PATHOLOGICAL FRACTURE: Chronic | Status: ACTIVE | Noted: 2019-02-05

## 2019-11-28 PROBLEM — M06.9 RHEUMATOID ARTHRITIS, UNSPECIFIED: Chronic | Status: ACTIVE | Noted: 2019-02-05

## 2019-11-28 RX ORDER — ESOMEPRAZOLE MAGNESIUM 40 MG/1
1 CAPSULE, DELAYED RELEASE ORAL
Qty: 14 | Refills: 0
Start: 2019-11-28 | End: 2019-12-11

## 2019-11-28 NOTE — ED PROVIDER NOTE - CLINICAL SUMMARY MEDICAL DECISION MAKING FREE TEXT BOX
59 year old female with upper abd pain. vitals WNL. PE as above.  labs are significant for elevated lfts. ua WNL. ct abdomen unremarkable, unchanged from prior. given gi cocktail with improvement of pain. likel gastritis. advised f/u with PMD about LFT elevation. return precautions given.

## 2019-11-28 NOTE — ED PROVIDER NOTE - PATIENT PORTAL LINK FT
You can access the FollowMyHealth Patient Portal offered by [facility] by registering at the following website: [email or web address]. By joining [facility]’s FollowMyHealth portal, you will also be able to view your health information using other applications (apps) compatible with our system.

## 2019-11-28 NOTE — ED PROVIDER NOTE - OBJECTIVE STATEMENT
59 year old female PMH diverticulitis, RAA< s/p adrienne/appe coming in with upper abd pain for the past couple of day that worsened today. pt states took damaris seltzer and pepto which helped symptoms but didn't resolve the pain. States had mild nausea in the ED but no vomiting. Denies fevers, chills, sweats,back pains, urinary complaints, D/C. Never had pains like this before.

## 2019-11-29 ENCOUNTER — INPATIENT (INPATIENT)
Facility: HOSPITAL | Age: 59
LOS: 3 days | Discharge: HOME | DRG: 439 | End: 2019-12-03
Attending: INTERNAL MEDICINE | Admitting: INTERNAL MEDICINE
Payer: COMMERCIAL

## 2019-11-29 VITALS
OXYGEN SATURATION: 100 % | TEMPERATURE: 98 F | SYSTOLIC BLOOD PRESSURE: 101 MMHG | DIASTOLIC BLOOD PRESSURE: 59 MMHG | WEIGHT: 138.01 LBS | HEART RATE: 46 BPM | RESPIRATION RATE: 20 BRPM | HEIGHT: 61 IN

## 2019-11-29 DIAGNOSIS — Z98.891 HISTORY OF UTERINE SCAR FROM PREVIOUS SURGERY: Chronic | ICD-10-CM

## 2019-11-29 DIAGNOSIS — Z90.49 ACQUIRED ABSENCE OF OTHER SPECIFIED PARTS OF DIGESTIVE TRACT: Chronic | ICD-10-CM

## 2019-11-30 DIAGNOSIS — R10.9 UNSPECIFIED ABDOMINAL PAIN: ICD-10-CM

## 2019-11-30 DIAGNOSIS — R94.5 ABNORMAL RESULTS OF LIVER FUNCTION STUDIES: ICD-10-CM

## 2019-11-30 DIAGNOSIS — K86.9 DISEASE OF PANCREAS, UNSPECIFIED: ICD-10-CM

## 2019-11-30 DIAGNOSIS — Z29.9 ENCOUNTER FOR PROPHYLACTIC MEASURES, UNSPECIFIED: ICD-10-CM

## 2019-11-30 LAB
ALBUMIN SERPL ELPH-MCNC: 3.4 G/DL — LOW (ref 3.5–5)
ALBUMIN SERPL ELPH-MCNC: 3.5 G/DL — SIGNIFICANT CHANGE UP (ref 3.5–5)
ALP SERPL-CCNC: 299 U/L — HIGH (ref 40–120)
ALP SERPL-CCNC: 307 U/L — HIGH (ref 40–120)
ALT FLD-CCNC: 1033 U/L DA — HIGH (ref 10–60)
ALT FLD-CCNC: 1276 U/L DA — HIGH (ref 10–60)
ANION GAP SERPL CALC-SCNC: 10 MMOL/L — SIGNIFICANT CHANGE UP (ref 5–17)
ANION GAP SERPL CALC-SCNC: 7 MMOL/L — SIGNIFICANT CHANGE UP (ref 5–17)
AST SERPL-CCNC: 1297 U/L — HIGH (ref 10–40)
AST SERPL-CCNC: 963 U/L — HIGH (ref 10–40)
BILIRUB DIRECT SERPL-MCNC: 1 MG/DL — HIGH (ref 0–0.2)
BILIRUB DIRECT SERPL-MCNC: 1.3 MG/DL — HIGH (ref 0–0.2)
BILIRUB INDIRECT FLD-MCNC: 0.5 MG/DL — SIGNIFICANT CHANGE UP (ref 0.2–1)
BILIRUB INDIRECT FLD-MCNC: 0.6 MG/DL — SIGNIFICANT CHANGE UP (ref 0.2–1)
BILIRUB SERPL-MCNC: 1.6 MG/DL — HIGH (ref 0.2–1.2)
BILIRUB SERPL-MCNC: 1.6 MG/DL — HIGH (ref 0.2–1.2)
BILIRUB SERPL-MCNC: 1.8 MG/DL — HIGH (ref 0.2–1.2)
BUN SERPL-MCNC: 11 MG/DL — SIGNIFICANT CHANGE UP (ref 7–18)
BUN SERPL-MCNC: 9 MG/DL — SIGNIFICANT CHANGE UP (ref 7–18)
CALCIUM SERPL-MCNC: 8.9 MG/DL — SIGNIFICANT CHANGE UP (ref 8.4–10.5)
CALCIUM SERPL-MCNC: 9 MG/DL — SIGNIFICANT CHANGE UP (ref 8.4–10.5)
CHLORIDE SERPL-SCNC: 106 MMOL/L — SIGNIFICANT CHANGE UP (ref 96–108)
CHLORIDE SERPL-SCNC: 108 MMOL/L — SIGNIFICANT CHANGE UP (ref 96–108)
CHOLEST SERPL-MCNC: 280 MG/DL — HIGH (ref 10–199)
CO2 SERPL-SCNC: 23 MMOL/L — SIGNIFICANT CHANGE UP (ref 22–31)
CO2 SERPL-SCNC: 25 MMOL/L — SIGNIFICANT CHANGE UP (ref 22–31)
CREAT SERPL-MCNC: 0.82 MG/DL — SIGNIFICANT CHANGE UP (ref 0.5–1.3)
CREAT SERPL-MCNC: 0.93 MG/DL — SIGNIFICANT CHANGE UP (ref 0.5–1.3)
GLUCOSE SERPL-MCNC: 111 MG/DL — HIGH (ref 70–99)
GLUCOSE SERPL-MCNC: 137 MG/DL — HIGH (ref 70–99)
HBA1C BLD-MCNC: 6.1 % — HIGH (ref 4–5.6)
HCT VFR BLD CALC: 39.6 % — SIGNIFICANT CHANGE UP (ref 34.5–45)
HCT VFR BLD CALC: 41.6 % — SIGNIFICANT CHANGE UP (ref 34.5–45)
HDLC SERPL-MCNC: 76 MG/DL — SIGNIFICANT CHANGE UP
HGB BLD-MCNC: 12.9 G/DL — SIGNIFICANT CHANGE UP (ref 11.5–15.5)
HGB BLD-MCNC: 13.5 G/DL — SIGNIFICANT CHANGE UP (ref 11.5–15.5)
LIDOCAIN IGE QN: 228 U/L — SIGNIFICANT CHANGE UP (ref 73–393)
LIPID PNL WITH DIRECT LDL SERPL: 172 MG/DL — SIGNIFICANT CHANGE UP
MAGNESIUM SERPL-MCNC: 2.1 MG/DL — SIGNIFICANT CHANGE UP (ref 1.6–2.6)
MCHC RBC-ENTMCNC: 28.7 PG — SIGNIFICANT CHANGE UP (ref 27–34)
MCHC RBC-ENTMCNC: 29 PG — SIGNIFICANT CHANGE UP (ref 27–34)
MCHC RBC-ENTMCNC: 32.5 GM/DL — SIGNIFICANT CHANGE UP (ref 32–36)
MCHC RBC-ENTMCNC: 32.6 GM/DL — SIGNIFICANT CHANGE UP (ref 32–36)
MCV RBC AUTO: 88.3 FL — SIGNIFICANT CHANGE UP (ref 80–100)
MCV RBC AUTO: 89 FL — SIGNIFICANT CHANGE UP (ref 80–100)
NRBC # BLD: 0 /100 WBCS — SIGNIFICANT CHANGE UP (ref 0–0)
NRBC # BLD: 0 /100 WBCS — SIGNIFICANT CHANGE UP (ref 0–0)
PHOSPHATE SERPL-MCNC: 3.5 MG/DL — SIGNIFICANT CHANGE UP (ref 2.5–4.5)
PLATELET # BLD AUTO: 291 K/UL — SIGNIFICANT CHANGE UP (ref 150–400)
PLATELET # BLD AUTO: 310 K/UL — SIGNIFICANT CHANGE UP (ref 150–400)
POTASSIUM SERPL-MCNC: 3.9 MMOL/L — SIGNIFICANT CHANGE UP (ref 3.5–5.3)
POTASSIUM SERPL-MCNC: 4.3 MMOL/L — SIGNIFICANT CHANGE UP (ref 3.5–5.3)
POTASSIUM SERPL-SCNC: 3.9 MMOL/L — SIGNIFICANT CHANGE UP (ref 3.5–5.3)
POTASSIUM SERPL-SCNC: 4.3 MMOL/L — SIGNIFICANT CHANGE UP (ref 3.5–5.3)
PROT SERPL-MCNC: 7.2 G/DL — SIGNIFICANT CHANGE UP (ref 6–8.3)
PROT SERPL-MCNC: 7.6 G/DL — SIGNIFICANT CHANGE UP (ref 6–8.3)
RBC # BLD: 4.45 M/UL — SIGNIFICANT CHANGE UP (ref 3.8–5.2)
RBC # BLD: 4.71 M/UL — SIGNIFICANT CHANGE UP (ref 3.8–5.2)
RBC # FLD: 13.2 % — SIGNIFICANT CHANGE UP (ref 10.3–14.5)
RBC # FLD: 13.2 % — SIGNIFICANT CHANGE UP (ref 10.3–14.5)
SODIUM SERPL-SCNC: 139 MMOL/L — SIGNIFICANT CHANGE UP (ref 135–145)
SODIUM SERPL-SCNC: 140 MMOL/L — SIGNIFICANT CHANGE UP (ref 135–145)
TOTAL CHOLESTEROL/HDL RATIO MEASUREMENT: 3.7 RATIO — SIGNIFICANT CHANGE UP (ref 3.3–7.1)
TRIGL SERPL-MCNC: 159 MG/DL — HIGH (ref 10–149)
TSH SERPL-MCNC: 0.42 UU/ML — SIGNIFICANT CHANGE UP (ref 0.34–4.82)
VIT B12 SERPL-MCNC: >2000 PG/ML — HIGH (ref 232–1245)
WBC # BLD: 5.43 K/UL — SIGNIFICANT CHANGE UP (ref 3.8–10.5)
WBC # BLD: 7.92 K/UL — SIGNIFICANT CHANGE UP (ref 3.8–10.5)
WBC # FLD AUTO: 5.43 K/UL — SIGNIFICANT CHANGE UP (ref 3.8–10.5)
WBC # FLD AUTO: 7.92 K/UL — SIGNIFICANT CHANGE UP (ref 3.8–10.5)

## 2019-11-30 PROCEDURE — 76705 ECHO EXAM OF ABDOMEN: CPT | Mod: 26

## 2019-11-30 PROCEDURE — 99285 EMERGENCY DEPT VISIT HI MDM: CPT

## 2019-11-30 RX ORDER — PANTOPRAZOLE SODIUM 20 MG/1
40 TABLET, DELAYED RELEASE ORAL ONCE
Refills: 0 | Status: COMPLETED | OUTPATIENT
Start: 2019-11-30 | End: 2019-11-30

## 2019-11-30 RX ORDER — INFLUENZA VIRUS VACCINE 15; 15; 15; 15 UG/.5ML; UG/.5ML; UG/.5ML; UG/.5ML
0.5 SUSPENSION INTRAMUSCULAR ONCE
Refills: 0 | Status: DISCONTINUED | OUTPATIENT
Start: 2019-11-30 | End: 2019-12-03

## 2019-11-30 RX ORDER — PANTOPRAZOLE SODIUM 20 MG/1
40 TABLET, DELAYED RELEASE ORAL EVERY 24 HOURS
Refills: 0 | Status: DISCONTINUED | OUTPATIENT
Start: 2019-12-01 | End: 2019-12-02

## 2019-11-30 RX ORDER — KETOROLAC TROMETHAMINE 30 MG/ML
15 SYRINGE (ML) INJECTION ONCE
Refills: 0 | Status: DISCONTINUED | OUTPATIENT
Start: 2019-11-30 | End: 2019-11-30

## 2019-11-30 RX ORDER — ONDANSETRON 8 MG/1
4 TABLET, FILM COATED ORAL ONCE
Refills: 0 | Status: COMPLETED | OUTPATIENT
Start: 2019-11-30 | End: 2019-11-30

## 2019-11-30 RX ORDER — METOCLOPRAMIDE HCL 10 MG
10 TABLET ORAL ONCE
Refills: 0 | Status: COMPLETED | OUTPATIENT
Start: 2019-11-30 | End: 2019-11-30

## 2019-11-30 RX ORDER — METOCLOPRAMIDE HCL 10 MG
10 TABLET ORAL EVERY 6 HOURS
Refills: 0 | Status: DISCONTINUED | OUTPATIENT
Start: 2019-11-30 | End: 2019-12-03

## 2019-11-30 RX ORDER — SODIUM CHLORIDE 9 MG/ML
1000 INJECTION INTRAMUSCULAR; INTRAVENOUS; SUBCUTANEOUS
Refills: 0 | Status: DISCONTINUED | OUTPATIENT
Start: 2019-11-30 | End: 2019-12-03

## 2019-11-30 RX ORDER — ONDANSETRON 8 MG/1
4 TABLET, FILM COATED ORAL EVERY 8 HOURS
Refills: 0 | Status: DISCONTINUED | OUTPATIENT
Start: 2019-11-30 | End: 2019-12-03

## 2019-11-30 RX ORDER — MORPHINE SULFATE 50 MG/1
4 CAPSULE, EXTENDED RELEASE ORAL ONCE
Refills: 0 | Status: DISCONTINUED | OUTPATIENT
Start: 2019-11-30 | End: 2019-11-30

## 2019-11-30 RX ORDER — SODIUM CHLORIDE 9 MG/ML
1000 INJECTION INTRAMUSCULAR; INTRAVENOUS; SUBCUTANEOUS ONCE
Refills: 0 | Status: COMPLETED | OUTPATIENT
Start: 2019-11-30 | End: 2019-11-30

## 2019-11-30 RX ADMIN — Medication 15 MILLIGRAM(S): at 04:25

## 2019-11-30 RX ADMIN — Medication 10 MILLIGRAM(S): at 10:59

## 2019-11-30 RX ADMIN — SODIUM CHLORIDE 70 MILLILITER(S): 9 INJECTION INTRAMUSCULAR; INTRAVENOUS; SUBCUTANEOUS at 08:31

## 2019-11-30 RX ADMIN — Medication 15 MILLIGRAM(S): at 16:26

## 2019-11-30 RX ADMIN — ONDANSETRON 4 MILLIGRAM(S): 8 TABLET, FILM COATED ORAL at 09:05

## 2019-11-30 RX ADMIN — Medication 15 MILLIGRAM(S): at 04:09

## 2019-11-30 RX ADMIN — SODIUM CHLORIDE 1000 MILLILITER(S): 9 INJECTION INTRAMUSCULAR; INTRAVENOUS; SUBCUTANEOUS at 01:51

## 2019-11-30 RX ADMIN — ONDANSETRON 4 MILLIGRAM(S): 8 TABLET, FILM COATED ORAL at 01:50

## 2019-11-30 RX ADMIN — Medication 15 MILLIGRAM(S): at 15:56

## 2019-11-30 RX ADMIN — MORPHINE SULFATE 4 MILLIGRAM(S): 50 CAPSULE, EXTENDED RELEASE ORAL at 04:25

## 2019-11-30 RX ADMIN — MORPHINE SULFATE 4 MILLIGRAM(S): 50 CAPSULE, EXTENDED RELEASE ORAL at 01:50

## 2019-11-30 RX ADMIN — Medication 10 MILLIGRAM(S): at 15:21

## 2019-11-30 RX ADMIN — PANTOPRAZOLE SODIUM 40 MILLIGRAM(S): 20 TABLET, DELAYED RELEASE ORAL at 15:04

## 2019-11-30 NOTE — CONSULT NOTE ADULT - NEGATIVE ENMT SYMPTOMS
no gum bleeding/no throat pain/no dysphagia/no nose bleeds/no dry mouth/no hearing difficulty/no ear pain

## 2019-11-30 NOTE — H&P ADULT - HISTORY OF PRESENT ILLNESS
59 years old Female from home with PMH of RA, diverticulitis PSH of appendectomy and cholecystomy presents with upper abd pain x 2 days, worse with eating, associated with NBNB vomiting. Seen in ED 2 days ago with CT abdomen and pelvis showing lesion in pancreatic body. Symptoms returned today after eating so comes to ED. Denies fever, diarrhea, constipation, back pain, etoh abuse, other acute complaints. 59 years old Female from home with PMH of RA, diverticulitis PSH of appendectomy and cholecystomy presents with upper abd pain x 2 days, worse with eating, associated with NBNB vomiting. Patient pain starts with epigastrium , 10/10 in intensity, intermittent and radiates to back. Patient is seen in ED 2 days ago with CT abdomen and pelvis showing lesion in pancreatic body. Symptoms returned today after eating so patient came to ED. Patient states that she never had such pain before. Patient does have history of gall bladder stones but s/p cholecystectomy Denies fever, diarrhea, constipation, back pain, etoh abuse, other acute complaints.  Patient denies any chest pain, shortness of breath, palpitations fever, chills, change in urinary or bowel habits.

## 2019-11-30 NOTE — H&P ADULT - PROBLEM SELECTOR PLAN 3
patient was found to have Pancreatic lesion on CT abdomen and pelvis.  Further characterization using MRCP.  GI consult Dr salmeron

## 2019-11-30 NOTE — H&P ADULT - ASSESSMENT
59 years old Female from home with PMH of RA, diverticulitis PSH of appendectomy and cholecystomy presents with upper abd pain x 2 days, worse with eating, associated with NBNB vomiting. Patient pain starts with epigastrium , 10/10 in intensity, intermittent and radiates to back. Patient is seen in ED 2 days ago with CT abdomen and pelvis showing lesion in pancreatic body. Symptoms returned today after eating so patient came to ED. Patient states that she never had such pain before. Patient does have history of gall bladder stones but s/p cholecystectomy Denies fever, diarrhea, constipation, back pain, etoh abuse, other acute complaints.  Patient denies any chest pain, shortness of breath, palpitations fever, chills, change in urinary or bowel habits.

## 2019-11-30 NOTE — H&P ADULT - NSHPPHYSICALEXAM_GEN_ALL_CORE
Vital Signs Last 24 Hrs  T(C): 36.8 (30 Nov 2019 04:32), Max: 36.8 (30 Nov 2019 04:32)  T(F): 98.2 (30 Nov 2019 04:32), Max: 98.2 (30 Nov 2019 04:32)  HR: 60 (30 Nov 2019 04:32) (46 - 60)  BP: 115/55 (30 Nov 2019 04:32) (101/59 - 115/55)  BP(mean): --  RR: 17 (30 Nov 2019 04:32) (17 - 20)  SpO2: 98% (30 Nov 2019 04:32) (98% - 100%)  PHYSICAL EXAM:  GENERAL: NAD, speaks in full sentences, no signs of respiratory distress  HEAD:  Atraumatic, Normocephalic  EYES: EOMI, PERRLA, conjunctiva and sclera clear  NECK: Supple, No JVD  CHEST/LUNG: Clear to auscultation bilaterally; No wheeze; No crackles; No accessory muscles used  HEART: Regular rate and rhythm; No murmurs;   ABDOMEN: TENDERNESS IN EPIGASTRIUM, BS positive  EXTREMITIES:  2+ Peripheral Pulses, No cyanosis or edema  PSYCH: AAOx3  NEUROLOGY: non-focal  SKIN: No rashes or lesions

## 2019-11-30 NOTE — H&P ADULT - PROBLEM SELECTOR PLAN 1
Patient presented with epigastric ans RUQ abdominal pain.  tenderness in epigastrium and RUQ.  LFTs are markedly elevated from 2 days ago.   Will get US hepatic.  Hepitis panel in am.  MRCP to rule out CBD obstruction.  GI consutl dr salmeron.  Clear liquid diet for now.  Onset for symptomatic nausea and vomiting.  advance diet as tolerated.

## 2019-11-30 NOTE — H&P ADULT - PROBLEM SELECTOR PLAN 4
IMPROVE VTE Individual Risk Assessment  RISK                                                          Points  [] Previous VTE                                           3  [] Thrombophilia                                        2  [] Lower limb paralysis                              2   [] Current Cancer                                       2   [] Immobilization > 24 hrs                        1  [] ICU/CCU stay > 24 hours                       1  [] Age > 60                                                   1  IMPROVE VTE Score = 1  no need for DVT chemoprophylaxis

## 2019-11-30 NOTE — CONSULT NOTE ADULT - SUBJECTIVE AND OBJECTIVE BOX
[  ] STAT REQUEST              [ X ] ROUTINE REQUEST    Patient is a 59 year old female with abdominal pain, nausea and vomiting. GI consulted to evaluate.        HPI:  59 years old female Female with past medical history significant for cholecystectomy presented with 2 days history of intermittent 8-10/10 severity colicky RUQ abdominal pain radiating to epigastric area and her back associated with nausea and vomiting.   Patient denies hematemesis, hematochezia, melena, fever, chills, chest pain, SOB, cough, palpitation, hematuria, dysuria or diarrhea.    PAIN MANAGEMENT:  Pain Scale:                8-10 /10  Pain Location:  RUQ abdominal pain radiating to herepigastric and back    Prior Colonoscopy:  No prior colonoscopy    PAST MEDICAL HISTORY  Osteoporosis  RA    Diverticulitis         PAST SURGICAL HISTORY  appendectomy  Cholecystectomy        Allergies    No Known Allergies          MEDICATIONS  (STANDING):  influenza   Vaccine 0.5 milliLiter(s) IntraMuscular once  metoclopramide Injectable 10 milliGRAM(s) IV Push once  pantoprazole  Injectable 40 milliGRAM(s) IV Push once  sodium chloride 0.9%. 1000 milliLiter(s) (70 mL/Hr) IV Continuous <Continuous>    MEDICATIONS  (PRN):  ondansetron Injectable 4 milliGRAM(s) IV Push every 8 hours PRN Nausea and/or Vomiting      SOCIAL HISTORY  Advanced Directives:       [ X ] Full Code       [  ] DNR  Marital Status:         [  ] M      [ X ] S      [  ] D       [  ] W  Children:       [X  ] Yes      [  ] No  Occupation:        [  ] Employed       [ X ] Unemployed       [  ] Retired  Diet:       [ X ] Regular       [  ] PEG feeding          [  ] NG tube feeding  Drug Use:           [ X ] Patient denied          [  ] Yes  Alcohol:           [X  ] No             [  ] Yes (socially)         [  ] Yes (chronic)  Tobacco:           [  ] Yes           [ X ] No        FAMILY HISTORY  [X] Heart Disease            [  ] Diabetes             [  ] HTN             [  ] Colon Cancer             [  ] Stomach Cancer              [  ] Pancreatic Cancer        VITAL SIGNS   Vital Signs Last 24 Hrs  T(C): 37.2 (2019 12:58), Max: 37.2 (2019 12:58)  T(F): 99 (2019 12:58), Max: 99 (2019 12:58)  HR: 72 (2019 12:58) (46 - 72)  BP: 110/56 (2019 12:58) (101/59 - 130/62)   RR: 18 (2019 12:58) (15 - 20)  SpO2: 97% (2019 12:58) (97% - 100%)  Daily Height in cm: 154.94 (2019 23:55)              CBC Full  -  ( 2019 09:18 )  WBC Count : 5.43 K/uL  RBC Count : 4.45 M/uL  Hemoglobin : 12.9 g/dL  Hematocrit : 39.6 %  Platelet Count - Automated : 310 K/uL  Mean Cell Volume : 89.0 fl  Mean Cell Hemoglobin : 29.0 pg  Mean Cell Hemoglobin Concentration : 32.6 gm/dL  Auto Neutrophil # : x  Auto Lymphocyte # : x  Auto Monocyte # : x  Auto Eosinophil # : x  Auto Basophil # : x  Auto Neutrophil % : x  Auto Lymphocyte % : x  Auto Monocyte % : x  Auto Eosinophil % : x  Auto Basophil % : x          140  |  108  |  9   ----------------------------<  111<H>  4.3   |  25  |  0.82    Ca    8.9      2019 09:18  Phos  3.5       Mg     2.1         TPro  7.2  /  Alb  3.4<L>  /  TBili  1.8<H>  /  DBili  1.3<H>  /  AST  1297<H>  /  ALT  1276<H>  /  AlkPhos  307<H>        Lipase, Serum: 228 U/L ( @ 02:06)   Urinalysis (11.27.19 @ 21:53)    pH Urine: 8.0    Glucose Qualitative, Urine: Negative    Blood, Urine: Negative    Color: Yellow    Urine Appearance: Clear    Bilirubin: Negative    Ketone - Urine: Negative    Specific Gravity: 1.015    Protein, Urine: Negative    Urobilinogen: 4    Nitrite: Negative    Leukocyte Esterase Concentration: Trace        ECG  Ventricular Rate 54 BPM    Atrial Rate 54 BPM    P-R Interval 174 ms    QRS Duration 86 ms    Q-T Interval 442 ms    QTC Calculation(Bezet) 419 ms    P Axis 46 degrees    R Axis 3 degrees    T Axis 50 degrees    Diagnosis Line Sinus bradycardia  Otherwise normal ECG      RADIOLOGY/IMAGING                  EXAM:  CT ABDOMEN AND PELVIS IC                            PROCEDURE DATE:  2019          INTERPRETATION:  CLINICAL INFORMATION: 59 years old. Female. upper abd   pain, elevated lft, s/p adrienne.    COMPARISON: CT abdomen pelvis 3/23/2017    PROCEDURE:   CT of the Abdomen and Pelvis was performed with intravenous contrast.   Intravenous contrast: 90 ml Omnipaque 350. 10 ml discarded.  Oral contrast: None.  Sagittal and coronal reformats were performed.    FINDINGS:    LOWER CHEST: Within normal limits.    LIVER: Mild hepatic steatosis.  BILE DUCTS: Normal caliber.  GALLBLADDER: Cholecystectomy redemonstrated.  SPLEEN: Within normal limits.  PANCREAS: Stable 7 mm hypodense lesion in the pancreatic body.  ADRENALS: Within normal limits.  KIDNEYS/URETERS: Enhance symmetrically. No hydronephrosis. 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    BLADDER: Within normal limits.  REPRODUCTIVE ORGANS: Uterus is unremarkable.    BOWEL: No bowel obstruction. Appendix is not visualized. Sigmoid   diverticulosis without evidence of diverticulitis.  PERITONEUM: No ascites.  VESSELS: Normal caliber abdominal aorta.  RETROPERITONEUM/LYMPH NODES: No lymphadenopathy.    ABDOMINAL WALL: Within normal limits.  BONES: Degenerative changes in the spine.    IMPRESSION:     No acute CT findings in the abdomen or pelvis.    Stable 7 mm hypodense lesion in the pancreatic body, which may represent   a cystic neoplasm.        EXAM:  US LIVER AND PANCREAS                            PROCEDURE DATE:  2019          INTERPRETATION:  US LIVER AND PANCREAS    HISTORY:  elevated LFTS    COMPARISON: CT abdomen and pelvis 2019    Multiple transverse and longitudinal sonographic images of the right   upper quadrant abdomen were obtained.    LIVER: The liver is diffusely increased in echogenicity, consistent with   fatty infiltration. No focal lesions are identified. Ultrasound   sensitivity is somewhat limited in this setting.    GALLBLADDER: Status post cholecystectomy.    BILE DUCTS: The common bile duct measures 10 mm.  No intrahepatic ductal   dilatation.    PANCREAS:  The head and proximal body are normal. The mid-distal body and   tail are obscured.    RIGHT KIDNEY: 10 cm in length.  No hydronephrosis or shadowing stone.    AORTA AND INFERIOR VENA CAVA: Normal in caliber.    FLUID: No ascites.    IMPRESSION:    Fatty liver.    Mild postcholecystectomy biliary dilatation.

## 2019-11-30 NOTE — ED PROVIDER NOTE - PHYSICAL EXAMINATION
GENERAL: well appearing, no acute distress   HEAD: atraumatic   EYES: EOMI, pink conjunctiva   ENT: moist oral mucosa   CARDIAC: RRR, no edema, distal pulses present   RESPIRATORY: lungs CTAB, no increased work of breathing   GASTROINTESTINAL: +RUQ abdominal tenderness, no rebound or guarding, bowel sounds presents  GENITOURINARY: no CVA tenderness   MUSCULOSKELETAL: no deformity   NEUROLOGICAL: AAOx3, spontaneous movement of extremities   SKIN: intact   PSYCHIATRIC: cooperative  HEME LYMPH: no lymphadenopathy

## 2019-11-30 NOTE — ED PROVIDER NOTE - OBJECTIVE STATEMENT
60 yo F PMH of RA, diverticulitis PSH of appy and cholecystomy presents with upper abd pain x 2 days, worse with eating, associated with NBNB vomiting. Seen in ED 2 days ago with CT showing lesion in pancreatic body. Symptoms returned today after eating so comes to ED. Denies fever, diarrhea, constipation, back pain, etoh abuse, other acute complaints.

## 2019-11-30 NOTE — ED PROVIDER NOTE - PROGRESS NOTE DETAILS
labs - increased in LFTs and bili from recent eval   Pt s/p cholecystectomy so no concern for cholecystitis. Symptoms possibly choledocholithiasis vs obstructive process from pancreatic mass (although would not expect pain and such an acute process?). Pt's PCP admits to Dr Nunez. Endorsed to MARWilliam

## 2019-11-30 NOTE — CONSULT NOTE ADULT - ASSESSMENT
A. The etiology for abdominal pain associated with transaminitis can be due to:  1. Retain CBD stone  2. R/o acute viral hepatitis    B. Pancreatic lesion  1. R/o pancreatic ca    Suggestions:    1. NPO  2. IVF hydration  3. MRCP  4. Follow up LFT's  5. Check hepatitis serology for A, B and C  6. Protonix daily  7. Avoid NSAID  8. DVT prophylaxis

## 2019-12-01 LAB
HAV IGM SER-ACNC: SIGNIFICANT CHANGE UP
HBV CORE IGM SER-ACNC: SIGNIFICANT CHANGE UP
HBV SURFACE AG SER-ACNC: SIGNIFICANT CHANGE UP
HCV AB S/CO SERPL IA: 0.21 S/CO — SIGNIFICANT CHANGE UP (ref 0–0.99)
HCV AB S/CO SERPL IA: 0.22 S/CO — SIGNIFICANT CHANGE UP (ref 0–0.99)
HCV AB SERPL-IMP: SIGNIFICANT CHANGE UP
HCV AB SERPL-IMP: SIGNIFICANT CHANGE UP

## 2019-12-01 PROCEDURE — 74183 MRI ABD W/O CNTR FLWD CNTR: CPT | Mod: 26

## 2019-12-01 RX ORDER — SODIUM CHLORIDE 9 MG/ML
1000 INJECTION INTRAMUSCULAR; INTRAVENOUS; SUBCUTANEOUS
Refills: 0 | Status: DISCONTINUED | OUTPATIENT
Start: 2019-12-01 | End: 2019-12-03

## 2019-12-01 RX ADMIN — SODIUM CHLORIDE 70 MILLILITER(S): 9 INJECTION INTRAMUSCULAR; INTRAVENOUS; SUBCUTANEOUS at 07:23

## 2019-12-01 RX ADMIN — PANTOPRAZOLE SODIUM 40 MILLIGRAM(S): 20 TABLET, DELAYED RELEASE ORAL at 17:43

## 2019-12-01 RX ADMIN — Medication 10 MILLIGRAM(S): at 09:17

## 2019-12-01 NOTE — PROGRESS NOTE ADULT - SUBJECTIVE AND OBJECTIVE BOX
Patient is a 59y old  Female who presents with a chief complaint of abdominal pain, nausea and vomiting. (2019 14:56)    pt seen in icu [  ], reg med floor [ x  ], bed [ x ], chair at bedside [   ], a+o x3 [ x ], lethargic [  ],  nad [ x ]      Allergies    No Known Allergies        Vitals    T(F): 98.2 (19 @ 05:15), Max: 99 (19 @ 12:58)  HR: 61 (19 @ 05:15) (56 - 72)  BP: 121/55 (19 @ 06:42) (89/48 - 130/62)  RR: 18 (19 @ 05:15) (18 - 18)  SpO2: 100% (19 @ 05:15) (97% - 100%)  Wt(kg): --  CAPILLARY BLOOD GLUCOSE          Labs                          12.9   5.43  )-----------( 310      ( 2019 09:18 )             39.6           140  |  108  |  9   ----------------------------<  111<H>  4.3   |  25  |  0.82    Ca    8.9      2019 09:18  Phos  3.5       Mg     2.1         TPro  7.2  /  Alb  3.4<L>  /  TBili  1.8<H>  /  DBili  1.3<H>  /  AST  1297<H>  /  ALT  1276<H>  /  AlkPhos  307<H>          Acute Hepatitis Panel (19 @ 18:54)    Hepatitis C Virus Interpretation: Nonreact: Hepatitis C AB  S/CO Ratio                        Interpretation  < 1.00                                   Non-Reactive  1.00 - 4.99                         Weakly-Reactive  >= 5.00                                Reactive  Non-Reactive: A person witha non-reactive HCV antibody result is  considered uninfected.  No further action is needed unless recent  infection is suspected.  In these cases, consider repeat testing later to  detect seroconversion..  Weakly-Reactive: HCV antibody test is abnormal, HCV RNA Qualitative test  will follow.  Reactive: HCV antibody test is abnormal, HCV RNA Qualitative test will  follow.  Note: HCV antibody testing is performed on the Abbott  system.    Hepatitis C Virus S/CO Ratio: 0.21 S/CO    Hepatitis B Core IgM Antibody: Nonreact    Hepatitis B Surface Antigen: Nonreact    Hepatitis A IgM Antibody: Nonreact            Radiology Results      < from: US Hepatic & Pancreatic (19 @ 08:55) >  IMPRESSION:    Fatty liver.    Mild postcholecystectomy biliary dilatation.      < end of copied text >        Meds    MEDICATIONS  (STANDING):  influenza   Vaccine 0.5 milliLiter(s) IntraMuscular once  pantoprazole  Injectable 40 milliGRAM(s) IV Push every 24 hours  sodium chloride 0.9%. 1000 milliLiter(s) (70 mL/Hr) IV Continuous <Continuous>  sodium chloride 0.9%. 1000 milliLiter(s) (70 mL/Hr) IV Continuous <Continuous>      MEDICATIONS  (PRN):  metoclopramide Injectable 10 milliGRAM(s) IV Push every 6 hours PRN nausea, vomit  ondansetron Injectable 4 milliGRAM(s) IV Push every 8 hours PRN Nausea and/or Vomiting      Physical Exam    Neuro :  no focal deficits  Respiratory: CTA B/L  CV: RRR, S1S2, no murmurs,   Abdominal: Soft, NT, ND +BS,  Extremities: No edema, + peripheral pulses    ASSESSMENT    epigastric pain r/o pud,   possible gastritis,   r/o pancreatitis,   jaundice r/o billary obstruction,   transaminitis r/o hepatitis,   h/o Osteoporosis  RA (rheumatoid arthritis)  Diverticulitis  No pertinent past medical history  History of appendectomy  History of cholecystectomy  H/O:       PLAN      cont protonix  f/u mri abd w/wo contrast   abd sono with Fatty liver. Mild postcholecystectomy biliary dilatation noted above  gi f/u.   acute hepatitis panel neg noted above  f/u lft  cont current meds Patient is a 59y old  Female who presents with a chief complaint of abdominal pain, nausea and vomiting. (2019 14:56)    pt seen in icu [  ], reg med floor [ x  ], bed [ x ], chair at bedside [   ], a+o x3 [ x ], lethargic [  ],  nad [ x ]      Allergies    No Known Allergies        Vitals    T(F): 98.2 (19 @ 05:15), Max: 99 (19 @ 12:58)  HR: 61 (19 @ 05:15) (56 - 72)  BP: 121/55 (19 @ 06:42) (89/48 - 130/62)  RR: 18 (19 @ 05:15) (18 - 18)  SpO2: 100% (19 @ 05:15) (97% - 100%)  Wt(kg): --  CAPILLARY BLOOD GLUCOSE          Labs                          12.9   5.43  )-----------( 310      ( 2019 09:18 )             39.6           140  |  108  |  9   ----------------------------<  111<H>  4.3   |  25  |  0.82    Ca    8.9      2019 09:18  Phos  3.5       Mg     2.1         TPro  7.2  /  Alb  3.4<L>  /  TBili  1.8<H>  /  DBili  1.3<H>  /  AST  1297<H>  /  ALT  1276<H>  /  AlkPhos  307<H>          Acute Hepatitis Panel (19 @ 18:54)    Hepatitis C Virus Interpretation: Nonreact: Hepatitis C AB  S/CO Ratio                        Interpretation  < 1.00                                   Non-Reactive  1.00 - 4.99                         Weakly-Reactive  >= 5.00                                Reactive  Non-Reactive: A person witha non-reactive HCV antibody result is  considered uninfected.  No further action is needed unless recent  infection is suspected.  In these cases, consider repeat testing later to  detect seroconversion..  Weakly-Reactive: HCV antibody test is abnormal, HCV RNA Qualitative test  will follow.  Reactive: HCV antibody test is abnormal, HCV RNA Qualitative test will  follow.  Note: HCV antibody testing is performed on the Abbott  system.    Hepatitis C Virus S/CO Ratio: 0.21 S/CO    Hepatitis B Core IgM Antibody: Nonreact    Hepatitis B Surface Antigen: Nonreact    Hepatitis A IgM Antibody: Nonreact            Radiology Results      < from: US Hepatic & Pancreatic (19 @ 08:55) >  IMPRESSION:    Fatty liver.    Mild postcholecystectomy biliary dilatation.      < end of copied text >        Meds    MEDICATIONS  (STANDING):  influenza   Vaccine 0.5 milliLiter(s) IntraMuscular once  pantoprazole  Injectable 40 milliGRAM(s) IV Push every 24 hours  sodium chloride 0.9%. 1000 milliLiter(s) (70 mL/Hr) IV Continuous <Continuous>  sodium chloride 0.9%. 1000 milliLiter(s) (70 mL/Hr) IV Continuous <Continuous>      MEDICATIONS  (PRN):  metoclopramide Injectable 10 milliGRAM(s) IV Push every 6 hours PRN nausea, vomit  ondansetron Injectable 4 milliGRAM(s) IV Push every 8 hours PRN Nausea and/or Vomiting      Physical Exam    Neuro :  no focal deficits  Respiratory: CTA B/L  CV: RRR, S1S2, no murmurs,   Abdominal: Soft, epigastric tender to moderate palp, ND +BS,  Extremities: No edema, + peripheral pulses        ASSESSMENT    epigastric pain r/o pud,   possible gastritis,   r/o pancreatitis,   jaundice r/o billary obstruction,   transaminitis r/o hepatitis,   h/o Osteoporosis  RA (rheumatoid arthritis)  Diverticulitis  No pertinent past medical history  History of appendectomy  History of cholecystectomy  H/O:       PLAN      cont protonix  f/u mri abd w/wo contrast   abd sono with Fatty liver. Mild postcholecystectomy biliary dilatation noted above  gi f/u.   acute hepatitis panel neg noted above  f/u lft  cont current meds

## 2019-12-01 NOTE — CHART NOTE - NSCHARTNOTEFT_GEN_A_CORE
EVENT:   Patient's BP - 89/48, HR - 61. Asymptomatic.   OBJECTIVE:  Vital Signs Last 24 Hrs  T(C): 36.8 (01 Dec 2019 05:15), Max: 37.2 (30 Nov 2019 12:58)  T(F): 98.2 (01 Dec 2019 05:15), Max: 99 (30 Nov 2019 12:58)  HR: 61 (01 Dec 2019 05:15) (56 - 72)  BP: 89/48 (01 Dec 2019 05:15) (89/48 - 130/62)  BP(mean): --  RR: 18 (01 Dec 2019 05:15) (15 - 18)  SpO2: 100% (01 Dec 2019 05:15) (97% - 100%)    FOCUSED PHYSICAL EXAM:    LABS:                        12.9   5.43  )-----------( 310      ( 30 Nov 2019 09:18 )             39.6     11-30    140  |  108  |  9   ----------------------------<  111<H>  4.3   |  25  |  0.82    Ca    8.9      30 Nov 2019 09:18  Phos  3.5     11-30  Mg     2.1     11-30    TPro  7.2  /  Alb  3.4<L>  /  TBili  1.8<H>  /  DBili  1.3<H>  /  AST  1297<H>  /  ALT  1276<H>  /  AlkPhos  307<H>  11-30      EKG:   IMAGING:    PLAN: IV Fluid: Normal Saline @ 70 ml/hrs (stop after 10 hrs).     FOLLOW UP / RESULT:

## 2019-12-01 NOTE — PROGRESS NOTE ADULT - SUBJECTIVE AND OBJECTIVE BOX
[   ] ICU                                          [   ] CCU                                      [  X ] Medical Floor      Patient is comfortable but still c/o abdominal pain. No new complaints reported, No N/V, hematemesis, hematochezia, melena, fever, chills, chest pain, SOB, cough or diarrhea reported.    VITALS  Vital Signs Last 24 Hrs  T(C): 36.8 (01 Dec 2019 05:15), Max: 37.2 (30 Nov 2019 12:58)  T(F): 98.2 (01 Dec 2019 05:15), Max: 99 (30 Nov 2019 12:58)  HR: 61 (01 Dec 2019 05:15) (56 - 72)  BP: 121/55 (01 Dec 2019 06:42) (89/48 - 130/62)   RR: 18 (01 Dec 2019 05:15) (18 - 18)  SpO2: 100% (01 Dec 2019 05:15) (97% - 100%)       MEDICATIONS  (STANDING):  influenza   Vaccine 0.5 milliLiter(s) IntraMuscular once  pantoprazole  Injectable 40 milliGRAM(s) IV Push every 24 hours  sodium chloride 0.9%. 1000 milliLiter(s) (70 mL/Hr) IV Continuous <Continuous>  sodium chloride 0.9%. 1000 milliLiter(s) (70 mL/Hr) IV Continuous <Continuous>    MEDICATIONS  (PRN):  metoclopramide Injectable 10 milliGRAM(s) IV Push every 6 hours PRN nausea, vomit  ondansetron Injectable 4 milliGRAM(s) IV Push every 8 hours PRN Nausea and/or Vomiting                            12.9   5.43  )-----------( 310      ( 30 Nov 2019 09:18 )             39.6       11-30    140  |  108  |  9   ----------------------------<  111<H>  4.3   |  25  |  0.82    Ca    8.9      30 Nov 2019 09:18  Phos  3.5     11-30  Mg     2.1     11-30    TPro  7.2  /  Alb  3.4<L>  /  TBili  1.8<H>  /  DBili  1.3<H>  /  AST  1297<H>  /  ALT  1276<H>  /  AlkPhos  307<H>  11-30

## 2019-12-01 NOTE — PROGRESS NOTE ADULT - NEGATIVE ENMT SYMPTOMS
no dysphagia/no hearing difficulty/no ear pain/no nose bleeds/no dry mouth/no throat pain/no gum bleeding

## 2019-12-02 DIAGNOSIS — Z71.89 OTHER SPECIFIED COUNSELING: ICD-10-CM

## 2019-12-02 DIAGNOSIS — E87.6 HYPOKALEMIA: ICD-10-CM

## 2019-12-02 DIAGNOSIS — Z02.9 ENCOUNTER FOR ADMINISTRATIVE EXAMINATIONS, UNSPECIFIED: ICD-10-CM

## 2019-12-02 DIAGNOSIS — K76.0 FATTY (CHANGE OF) LIVER, NOT ELSEWHERE CLASSIFIED: ICD-10-CM

## 2019-12-02 LAB
ALBUMIN SERPL ELPH-MCNC: 3.1 G/DL — LOW (ref 3.5–5)
ALP SERPL-CCNC: 253 U/L — HIGH (ref 40–120)
ALT FLD-CCNC: 649 U/L DA — HIGH (ref 10–60)
ANION GAP SERPL CALC-SCNC: 10 MMOL/L — SIGNIFICANT CHANGE UP (ref 5–17)
AST SERPL-CCNC: 180 U/L — HIGH (ref 10–40)
BILIRUB SERPL-MCNC: 0.7 MG/DL — SIGNIFICANT CHANGE UP (ref 0.2–1.2)
BUN SERPL-MCNC: 7 MG/DL — SIGNIFICANT CHANGE UP (ref 7–18)
CALCIUM SERPL-MCNC: 9.1 MG/DL — SIGNIFICANT CHANGE UP (ref 8.4–10.5)
CHLORIDE SERPL-SCNC: 107 MMOL/L — SIGNIFICANT CHANGE UP (ref 96–108)
CO2 SERPL-SCNC: 22 MMOL/L — SIGNIFICANT CHANGE UP (ref 22–31)
CREAT SERPL-MCNC: 0.7 MG/DL — SIGNIFICANT CHANGE UP (ref 0.5–1.3)
GLUCOSE SERPL-MCNC: 123 MG/DL — HIGH (ref 70–99)
HCT VFR BLD CALC: 36.9 % — SIGNIFICANT CHANGE UP (ref 34.5–45)
HGB BLD-MCNC: 12.3 G/DL — SIGNIFICANT CHANGE UP (ref 11.5–15.5)
MCHC RBC-ENTMCNC: 29.4 PG — SIGNIFICANT CHANGE UP (ref 27–34)
MCHC RBC-ENTMCNC: 33.3 GM/DL — SIGNIFICANT CHANGE UP (ref 32–36)
MCV RBC AUTO: 88.3 FL — SIGNIFICANT CHANGE UP (ref 80–100)
NRBC # BLD: 0 /100 WBCS — SIGNIFICANT CHANGE UP (ref 0–0)
PLATELET # BLD AUTO: 245 K/UL — SIGNIFICANT CHANGE UP (ref 150–400)
POTASSIUM SERPL-MCNC: 3.2 MMOL/L — LOW (ref 3.5–5.3)
POTASSIUM SERPL-SCNC: 3.2 MMOL/L — LOW (ref 3.5–5.3)
PROT SERPL-MCNC: 6.6 G/DL — SIGNIFICANT CHANGE UP (ref 6–8.3)
RBC # BLD: 4.18 M/UL — SIGNIFICANT CHANGE UP (ref 3.8–5.2)
RBC # FLD: 13.6 % — SIGNIFICANT CHANGE UP (ref 10.3–14.5)
SODIUM SERPL-SCNC: 139 MMOL/L — SIGNIFICANT CHANGE UP (ref 135–145)
WBC # BLD: 6.18 K/UL — SIGNIFICANT CHANGE UP (ref 3.8–10.5)
WBC # FLD AUTO: 6.18 K/UL — SIGNIFICANT CHANGE UP (ref 3.8–10.5)

## 2019-12-02 RX ORDER — PANTOPRAZOLE SODIUM 20 MG/1
40 TABLET, DELAYED RELEASE ORAL
Refills: 0 | Status: DISCONTINUED | OUTPATIENT
Start: 2019-12-03 | End: 2019-12-03

## 2019-12-02 RX ORDER — POTASSIUM CHLORIDE 20 MEQ
10 PACKET (EA) ORAL ONCE
Refills: 0 | Status: COMPLETED | OUTPATIENT
Start: 2019-12-02 | End: 2019-12-02

## 2019-12-02 RX ADMIN — Medication 100 MILLIEQUIVALENT(S): at 11:58

## 2019-12-02 NOTE — PROGRESS NOTE ADULT - PROBLEM SELECTOR PLAN 1
Tenderness in epigastrium and RUQ -- improving   LFTs are markedly elevated from 2 days ago -- downtrending well   US hepatic shows fatty liver .  pt is not on statin, denies taking tylenol or ETOH abuse   Hepatitis panel -- negative for acute hepatitis   GI- Dr. Monroe appreciate -  MRCP done -- No  CBD obstruction.

## 2019-12-02 NOTE — PROGRESS NOTE ADULT - PROBLEM SELECTOR PLAN 2
improving now - Rt lower abd pain at times   moving bowel 2 days ago  denies N/V  advanced diet as tolerated

## 2019-12-02 NOTE — PROGRESS NOTE ADULT - NEGATIVE ENMT SYMPTOMS
no nose bleeds/no throat pain/no dysphagia/no hearing difficulty/no gum bleeding/no dry mouth/no ear pain

## 2019-12-02 NOTE — PROGRESS NOTE ADULT - PROBLEM SELECTOR PLAN 5
K 3.2 this morning likely due to poor appetite for abd. pain and was on clear liquids x 3 days   replaced Potassium  f/u BMP in AM

## 2019-12-02 NOTE — PROGRESS NOTE ADULT - SUBJECTIVE AND OBJECTIVE BOX
NP Note discussed with  Primary Attending    Patient is a 59y old  Female who presents with a chief complaint of abdominal pain, nausea and vomiting. (02 Dec 2019 07:28)     59 years old Female from home with PMH of RA, diverticulitis PSH of appendectomy and cholecystomy presents with upper abd pain x 2 days, worse with eating, associated with NBNB vomiting. Patient pain starts with epigastrium , 10/10 in intensity, intermittent and radiates to back. Patient is seen in ED 2 days ago with CT abdomen and pelvis showing lesion in pancreatic body. Symptoms returned today after eating so patient came to ED. Admitted to medicine for Abd. pain. MRCP was performed, unchanged pancreatic lesion from 2/28/2017.     INTERVAL HPI/OVERNIGHT EVENTS: no new complaints    MEDICATIONS  (STANDING):  influenza   Vaccine 0.5 milliLiter(s) IntraMuscular once  pantoprazole  Injectable 40 milliGRAM(s) IV Push every 24 hours  potassium chloride  10 mEq/100 mL IVPB 10 milliEquivalent(s) IV Intermittent once  sodium chloride 0.9%. 1000 milliLiter(s) (70 mL/Hr) IV Continuous <Continuous>  sodium chloride 0.9%. 1000 milliLiter(s) (70 mL/Hr) IV Continuous <Continuous>    MEDICATIONS  (PRN):  metoclopramide Injectable 10 milliGRAM(s) IV Push every 6 hours PRN nausea, vomit  ondansetron Injectable 4 milliGRAM(s) IV Push every 8 hours PRN Nausea and/or Vomiting      __________________________________________________  REVIEW OF SYSTEMS:    CONSTITUTIONAL: No fever,   EYES: no acute visual disturbances  NECK: No pain or stiffness  RESPIRATORY: No cough; No shortness of breath  CARDIOVASCULAR: No chest pain, no palpitations  GASTROINTESTINAL: No pain. No nausea or vomiting; No diarrhea   NEUROLOGICAL: No headache or numbness, no tremors  MUSCULOSKELETAL: No joint pain, no muscle pain  GENITOURINARY: no dysuria, no frequency, no hesitancy  PSYCHIATRY: no depression , no anxiety  ALL OTHER  ROS negative        Vital Signs Last 24 Hrs  T(C): 36.5 (02 Dec 2019 05:15), Max: 36.7 (01 Dec 2019 21:02)  T(F): 97.7 (02 Dec 2019 05:15), Max: 98 (01 Dec 2019 21:02)  HR: 64 (02 Dec 2019 05:15) (64 - 72)  BP: 100/64 (02 Dec 2019 05:15) (100/64 - 126/68)  BP(mean): --  RR: 17 (02 Dec 2019 05:15) (17 - 18)  SpO2: 97% (02 Dec 2019 05:15) (97% - 98%)    ________________________________________________  PHYSICAL EXAM:  GENERAL: NAD  HEENT: Normocephalic;  conjunctivae and sclerae clear; moist mucous membranes;   NECK : supple  CHEST/LUNG: Clear to auscultation bilaterally with good air entry   HEART: S1 S2  regular; no murmurs, gallops or rubs  ABDOMEN: Soft, Nontender, Nondistended; Bowel sounds present  EXTREMITIES: no cyanosis; no edema; no calf tenderness  SKIN: warm and dry; no rash  NERVOUS SYSTEM:  Awake and alert; Oriented  to place, person and time ; no new deficits    _________________________________________________  LABS:                        12.3   6.18  )-----------( 245      ( 02 Dec 2019 07:02 )             36.9     12-02    139  |  107  |  7   ----------------------------<  123<H>  3.2<L>   |  22  |  0.70    Ca    9.1      02 Dec 2019 07:02    TPro  6.6  /  Alb  3.1<L>  /  TBili  0.7  /  DBili  x   /  AST  180<H>  /  ALT  649<H>  /  AlkPhos  253<H>  12-02        CAPILLARY BLOOD GLUCOSE            RADIOLOGY & ADDITIONAL TESTS:    Imaging Personally Reviewed:  YES/NO    Consultant(s) Notes Reviewed:   YES/ No    Care Discussed with Consultants :     Plan of care was discussed with patient and /or primary care giver; all questions and concerns were addressed and care was aligned with patient's wishes. NP Note discussed with  Primary Attending    Patient is a 59y old  Female who presents with a chief complaint of abdominal pain, nausea and vomiting. (02 Dec 2019 07:28)     59 years old Female from home with PMH of RA, diverticulitis PSH of appendectomy and cholecystomy presents with upper abd pain x 2 days, worse with eating, associated with NBNB vomiting. Patient pain starts with epigastrium , 10/10 in intensity, intermittent and radiates to back. Patient is seen in ED 2 days ago with CT abdomen and pelvis showing lesion in pancreatic body. Symptoms returned today after eating so patient came to ED. Admitted to medicine for Abd. pain. MRCP was performed, unchanged pancreatic lesion from 2/28/2017.   Tolerated clear liquids well this morning, advanced diet to soft this afternoon. LFTs downtrending nicely, hepatitis panels were negative. Potassium replaced this morning.       INTERVAL HPI/OVERNIGHT EVENTS: no new complaints    MEDICATIONS  (STANDING):  influenza   Vaccine 0.5 milliLiter(s) IntraMuscular once  pantoprazole  Injectable 40 milliGRAM(s) IV Push every 24 hours  potassium chloride  10 mEq/100 mL IVPB 10 milliEquivalent(s) IV Intermittent once  sodium chloride 0.9%. 1000 milliLiter(s) (70 mL/Hr) IV Continuous <Continuous>  sodium chloride 0.9%. 1000 milliLiter(s) (70 mL/Hr) IV Continuous <Continuous>    MEDICATIONS  (PRN):  metoclopramide Injectable 10 milliGRAM(s) IV Push every 6 hours PRN nausea, vomit  ondansetron Injectable 4 milliGRAM(s) IV Push every 8 hours PRN Nausea and/or Vomiting      __________________________________________________  REVIEW OF SYSTEMS:    CONSTITUTIONAL: No fever,   EYES: no acute visual disturbances  NECK: No pain or stiffness  RESPIRATORY: No cough; No shortness of breath  CARDIOVASCULAR: No chest pain, no palpitations  GASTROINTESTINAL: No pain. No nausea or vomiting; No diarrhea   NEUROLOGICAL: No headache or numbness, no tremors  MUSCULOSKELETAL: No joint pain, no muscle pain  GENITOURINARY: no dysuria, no frequency, no hesitancy  PSYCHIATRY: no depression , no anxiety  ALL OTHER  ROS negative        Vital Signs Last 24 Hrs  T(C): 36.5 (02 Dec 2019 05:15), Max: 36.7 (01 Dec 2019 21:02)  T(F): 97.7 (02 Dec 2019 05:15), Max: 98 (01 Dec 2019 21:02)  HR: 64 (02 Dec 2019 05:15) (64 - 72)  BP: 100/64 (02 Dec 2019 05:15) (100/64 - 126/68)  BP(mean): --  RR: 17 (02 Dec 2019 05:15) (17 - 18)  SpO2: 97% (02 Dec 2019 05:15) (97% - 98%)    ________________________________________________  PHYSICAL EXAM:  GENERAL: NAD  HEENT: Normocephalic;  conjunctivae and sclerae clear; moist mucous membranes;   NECK : supple  CHEST/LUNG: Clear to auscultation bilaterally with good air entry   HEART: S1 S2  regular; no murmurs, gallops or rubs  ABDOMEN: Soft, Nontender, Nondistended; Bowel sounds present  EXTREMITIES: no cyanosis; no edema; no calf tenderness  SKIN: warm and dry; no rash  NERVOUS SYSTEM:  Awake and alert; Oriented  to place, person and time ; no new deficits    _________________________________________________  LABS:                        12.3   6.18  )-----------( 245      ( 02 Dec 2019 07:02 )             36.9     12-02    139  |  107  |  7   ----------------------------<  123<H>  3.2<L>   |  22  |  0.70    Ca    9.1      02 Dec 2019 07:02    TPro  6.6  /  Alb  3.1<L>  /  TBili  0.7  /  DBili  x   /  AST  180<H>  /  ALT  649<H>  /  AlkPhos  253<H>  12-02        CAPILLARY BLOOD GLUCOSE            RADIOLOGY & ADDITIONAL TESTS:  EXAM:  MR ABDOMEN WAW IC                            PROCEDURE DATE:  12/01/2019          INTERPRETATION:  FINAL REPORT:    PROCEDURE INFORMATION:   Exam: MR Abdomen Without and With Contrast   Exam date and time: 12/1/2019 10:03 AM   Age: 59 years old   Clinical history: Epigastric pain. History of diverticulitis,   cholecystectomy.    TECHNIQUE:   Imaging protocol: MR of the abdomen without and with intravenous   contrast.   3D rendering: MIP reconstructed images were created and reviewed.   Contrast material: GADAVIST; Contrast volume: 6.5 ml; Contrast route: IV;     COMPARISON:   Abdominal ultrasound 11/30/2019 and CT abdomen/pelvis 11/27/2019    FINDINGS:   Liver: Fatty infiltration in the right hepatic lobe. No mass.  Gallbladder and bile ducts: Cholecystectomy. No biliary ductal dilatation   or common bile duct stone.  Pancreas: 1.1 cm cystic lesion in the pancreatic body (series 4 image   11), unchanged since 3/23/2017, slightly increased in size since   1/28/2014, previously 8 mm. No evidence of communication with the   pancreatic duct. Mild peripancreatic fluid and edema involving the   pancreatic head.  Spleen: Normal size. No mass.  Adrenals: Unremarkable.  Kidneys and ureters: Slightly malrotated right kidney. No hydronephrosis   or mass.    Stomach and bowel: Colonic diverticulosis. Bowel loops normal in caliber.  Intraperitoneal space: Small amount of ascites.  Vessels: Abdominal aorta normal in caliber. Portal, splenic, and superior   mesenteric veins are patent. IVC and hepatic veins are patent. Hepatic   artery has a separate origin directly from the abdominal aorta, an   anatomic variant.  Abdominal wall: Unremarkable.  Bones: No aggressive osseous lesion.    IMPRESSION:     1.1 cm cystic lesion within the pancreatic body, unchanged since   2/28/2017, slightly increased in size since 1/28/2014; continued   follow-up is recommended with a pre and post IV contrast MRI/MRCP of the   abdomen one year.    Mild peripancreatic edema and fluid involving the pancreatic head;   clinical correlation is recommended for pancreatitis.        TIESHA SOLIS   This document has been electronically signed. Dec  1 2019 12:55PM              EXAM:  US LIVER AND PANCREAS                            PROCEDURE DATE:  11/30/2019          INTERPRETATION:  US LIVER AND PANCREAS    HISTORY:  elevated LFTS    COMPARISON: CT abdomen and pelvis 11/27/2019    Multiple transverse and longitudinal sonographic images of the right   upper quadrant abdomen were obtained.    LIVER: The liver is diffusely increased in echogenicity, consistent with   fatty infiltration. No focal lesions are identified. Ultrasound   sensitivity is somewhat limited in this setting.    GALLBLADDER: Status post cholecystectomy.    BILE DUCTS: The common bile duct measures 10 mm.  No intrahepatic ductal   dilatation.    PANCREAS:  The head and proximal body are normal. The mid-distal body and   tail are obscured.    RIGHT KIDNEY: 10 cm in length.  No hydronephrosis or shadowing stone.    AORTA AND INFERIOR VENA CAVA: Normal in caliber.    FLUID: No ascites.    IMPRESSION:    Fatty liver.    Mild postcholecystectomy biliary dilatation.      Imaging Personally Reviewed:  YES    Consultant(s) Notes Reviewed:   YES    Care Discussed with Consultants : GI     Plan of care was discussed with patient and /or primary care giver; all questions and concerns were addressed and care was aligned with patient's wishes.

## 2019-12-02 NOTE — PROGRESS NOTE ADULT - SUBJECTIVE AND OBJECTIVE BOX
Patient is a 59y old  Female who presents with a chief complaint of abdominal pain, nausea and vomiting. (01 Dec 2019 08:18)      pt seen in icu [  ], reg med floor [ x  ], bed [ x ], chair at bedside [   ], a+o x3 [ x ], lethargic [  ],  nad [ x ]      Allergies    No Known Allergies        Vitals    T(F): 97.7 (19 @ 05:15), Max: 98 (19 @ 21:02)  HR: 64 (19 @ 05:15) (64 - 72)  BP: 100/64 (19 @ 05:15) (100/64 - 126/68)  RR: 17 (19 @ 05:15) (17 - 18)  SpO2: 97% (19 @ 05:15) (97% - 98%)  Wt(kg): --  CAPILLARY BLOOD GLUCOSE          Labs                          12.3   6.18  )-----------( 245      ( 02 Dec 2019 07:02 )             36.9       30    140  |  108  |  9   ----------------------------<  111<H>  4.3   |  25  |  0.82    Ca    8.9      2019 09:18  Phos  3.5       Mg     2.1         TPro  7.2  /  Alb  3.4<L>  /  TBili  1.8<H>  /  DBili  1.3<H>  /  AST  1297<H>  /  ALT  1276<H>  /  AlkPhos  307<H>          Radiology Results      < from: MR Abdomen w/wo IV Cont (19 @ 11:13) >  IMPRESSION:     1.1 cm cystic lesion within the pancreatic body, unchanged since   2017, slightly increased in size since 2014; continued   follow-up is recommended with a pre and post IV contrast MRI/MRCP of the   abdomen one year.    Mild peripancreatic edema and fluid involving the pancreatic head;   clinical correlation is recommended for pancreatitis.      < end of copied text >        Meds    MEDICATIONS  (STANDING):  influenza   Vaccine 0.5 milliLiter(s) IntraMuscular once  pantoprazole  Injectable 40 milliGRAM(s) IV Push every 24 hours  sodium chloride 0.9%. 1000 milliLiter(s) (70 mL/Hr) IV Continuous <Continuous>  sodium chloride 0.9%. 1000 milliLiter(s) (70 mL/Hr) IV Continuous <Continuous>      MEDICATIONS  (PRN):  metoclopramide Injectable 10 milliGRAM(s) IV Push every 6 hours PRN nausea, vomit  ondansetron Injectable 4 milliGRAM(s) IV Push every 8 hours PRN Nausea and/or Vomiting      Physical Exam      Neuro :  no focal deficits  Respiratory: CTA B/L  CV: RRR, S1S2, no murmurs,   Abdominal: Soft, epigastric tender to moderate palp, ND +BS,  Extremities: No edema, + peripheral pulses        ASSESSMENT    epigastric pain likely 2nd to acute pancreatitis,   possible gastritis,   billary obstruction r/o,   viral hepatitis r/o,   h/o Osteoporosis  RA (rheumatoid arthritis)  Diverticulitis  No pertinent past medical history  History of appendectomy  History of cholecystectomy  H/O:       PLAN      cont protonix  mri abd w/wo contrast with 1.1 cm cystic lesion within the pancreatic body, unchanged since 2017, Mild peripancreatic edema and fluid involving the pancreatic head;   clinical correlation is recommended for pancreatitis noted above.  abd sono with Fatty liver. Mild postcholecystectomy biliary dilatation noted above  gi f/u.   acute hepatitis panel neg noted above  f/u lft  cont current meds Patient is a 59y old  Female who presents with a chief complaint of abdominal pain, nausea and vomiting. (01 Dec 2019 08:18)      pt seen in icu [  ], reg med floor [ x  ], bed [ x ], chair at bedside [   ], a+o x3 [ x ], lethargic [  ],  nad [ x ]      pt states feeling better      Allergies    No Known Allergies        Vitals    T(F): 97.7 (19 @ 05:15), Max: 98 (19 @ 21:02)  HR: 64 (19 @ 05:15) (64 - 72)  BP: 100/64 (19 @ 05:15) (100/64 - 126/68)  RR: 17 (19 @ 05:15) (17 - 18)  SpO2: 97% (19 @ 05:15) (97% - 98%)  Wt(kg): --  CAPILLARY BLOOD GLUCOSE          Labs                          12.3   6.18  )-----------( 245      ( 02 Dec 2019 07:02 )             36.9       11-30    140  |  108  |  9   ----------------------------<  111<H>  4.3   |  25  |  0.82    Ca    8.9      2019 09:18  Phos  3.5       Mg     2.1         TPro  7.2  /  Alb  3.4<L>  /  TBili  1.8<H>  /  DBili  1.3<H>  /  AST  1297<H>  /  ALT  1276<H>  /  AlkPhos  307<H>  30        Radiology Results      < from: MR Abdomen w/wo IV Cont (19 @ 11:13) >  IMPRESSION:     1.1 cm cystic lesion within the pancreatic body, unchanged since   2017, slightly increased in size since 2014; continued   follow-up is recommended with a pre and post IV contrast MRI/MRCP of the   abdomen one year.    Mild peripancreatic edema and fluid involving the pancreatic head;   clinical correlation is recommended for pancreatitis.      < end of copied text >        Meds    MEDICATIONS  (STANDING):  influenza   Vaccine 0.5 milliLiter(s) IntraMuscular once  pantoprazole  Injectable 40 milliGRAM(s) IV Push every 24 hours  sodium chloride 0.9%. 1000 milliLiter(s) (70 mL/Hr) IV Continuous <Continuous>  sodium chloride 0.9%. 1000 milliLiter(s) (70 mL/Hr) IV Continuous <Continuous>      MEDICATIONS  (PRN):  metoclopramide Injectable 10 milliGRAM(s) IV Push every 6 hours PRN nausea, vomit  ondansetron Injectable 4 milliGRAM(s) IV Push every 8 hours PRN Nausea and/or Vomiting      Physical Exam      Neuro :  no focal deficits  Respiratory: CTA B/L  CV: RRR, S1S2, no murmurs,   Abdominal: Soft, mild epigastric tender to moderate palp, ND +BS,  Extremities: No edema, + peripheral pulses        ASSESSMENT    epigastric pain likely 2nd to acute pancreatitis,   possible gastritis,   billary obstruction r/o,   viral hepatitis r/o,   h/o Osteoporosis  RA (rheumatoid arthritis)  Diverticulitis  No pertinent past medical history  History of appendectomy  History of cholecystectomy  H/O:       PLAN      cont protonix  mri abd w/wo contrast with 1.1 cm cystic lesion within the pancreatic body, unchanged since 2017, Mild peripancreatic edema and fluid involving the pancreatic head;   clinical correlation is recommended for pancreatitis noted above.  abd sono with Fatty liver. Mild postcholecystectomy biliary dilatation noted above  gi f/u.   acute hepatitis panel neg noted above  f/u lft  cont current meds

## 2019-12-02 NOTE — PROGRESS NOTE ADULT - SUBJECTIVE AND OBJECTIVE BOX
[   ] ICU                                          [   ] CCU                                      [ X  ] Medical Floor      Patient is comfortable. No new complaints reported, No abdominal pain, N/V, hematemesis, hematochezia, melena, fever, chills, chest pain, SOB, cough or diarrhea reported.    VITALS  Vital Signs Last 24 Hrs  T(C): 37 (02 Dec 2019 14:00), Max: 37 (02 Dec 2019 14:00)  T(F): 98.6 (02 Dec 2019 14:00), Max: 98.6 (02 Dec 2019 14:00)  HR: 72 (02 Dec 2019 14:00) (64 - 72)  BP: 109/65 (02 Dec 2019 14:00) (100/64 - 119/78)   RR: 16 (02 Dec 2019 14:00) (16 - 18)  SpO2: 97% (02 Dec 2019 14:00) (97% - 98%)         MEDICATIONS  (STANDING):  influenza   Vaccine 0.5 milliLiter(s) IntraMuscular once  sodium chloride 0.9%. 1000 milliLiter(s) (70 mL/Hr) IV Continuous <Continuous>  sodium chloride 0.9%. 1000 milliLiter(s) (70 mL/Hr) IV Continuous <Continuous>    MEDICATIONS  (PRN):  metoclopramide Injectable 10 milliGRAM(s) IV Push every 6 hours PRN nausea, vomit  ondansetron Injectable 4 milliGRAM(s) IV Push every 8 hours PRN Nausea and/or Vomiting                            12.3   6.18  )-----------( 245      ( 02 Dec 2019 07:02 )             36.9       12-02    139  |  107  |  7   ----------------------------<  123<H>  3.2<L>   |  22  |  0.70    Ca    9.1      02 Dec 2019 07:02    TPro  6.6  /  Alb  3.1<L>  /  TBili  0.7  /  DBili  x   /  AST  180<H>  /  ALT  649<H>  /  AlkPhos  253<H>  12-02

## 2019-12-03 ENCOUNTER — TRANSCRIPTION ENCOUNTER (OUTPATIENT)
Age: 59
End: 2019-12-03

## 2019-12-03 VITALS
HEART RATE: 76 BPM | SYSTOLIC BLOOD PRESSURE: 122 MMHG | TEMPERATURE: 98 F | OXYGEN SATURATION: 99 % | RESPIRATION RATE: 16 BRPM | DIASTOLIC BLOOD PRESSURE: 72 MMHG

## 2019-12-03 LAB
ALBUMIN SERPL ELPH-MCNC: 3.3 G/DL — LOW (ref 3.5–5)
ALP SERPL-CCNC: 238 U/L — HIGH (ref 40–120)
ALT FLD-CCNC: 463 U/L DA — HIGH (ref 10–60)
ANION GAP SERPL CALC-SCNC: 5 MMOL/L — SIGNIFICANT CHANGE UP (ref 5–17)
AST SERPL-CCNC: 80 U/L — HIGH (ref 10–40)
BILIRUB DIRECT SERPL-MCNC: 0.2 MG/DL — SIGNIFICANT CHANGE UP (ref 0–0.2)
BILIRUB SERPL-MCNC: 0.5 MG/DL — SIGNIFICANT CHANGE UP (ref 0.2–1.2)
BUN SERPL-MCNC: 6 MG/DL — LOW (ref 7–18)
CALCIUM SERPL-MCNC: 9 MG/DL — SIGNIFICANT CHANGE UP (ref 8.4–10.5)
CHLORIDE SERPL-SCNC: 107 MMOL/L — SIGNIFICANT CHANGE UP (ref 96–108)
CO2 SERPL-SCNC: 28 MMOL/L — SIGNIFICANT CHANGE UP (ref 22–31)
CREAT SERPL-MCNC: 0.64 MG/DL — SIGNIFICANT CHANGE UP (ref 0.5–1.3)
GLUCOSE SERPL-MCNC: 96 MG/DL — SIGNIFICANT CHANGE UP (ref 70–99)
HCT VFR BLD CALC: 40.2 % — SIGNIFICANT CHANGE UP (ref 34.5–45)
HGB BLD-MCNC: 12.6 G/DL — SIGNIFICANT CHANGE UP (ref 11.5–15.5)
MCHC RBC-ENTMCNC: 28.3 PG — SIGNIFICANT CHANGE UP (ref 27–34)
MCHC RBC-ENTMCNC: 31.3 GM/DL — LOW (ref 32–36)
MCV RBC AUTO: 90.3 FL — SIGNIFICANT CHANGE UP (ref 80–100)
NRBC # BLD: 0 /100 WBCS — SIGNIFICANT CHANGE UP (ref 0–0)
PLATELET # BLD AUTO: 264 K/UL — SIGNIFICANT CHANGE UP (ref 150–400)
POTASSIUM SERPL-MCNC: 3.6 MMOL/L — SIGNIFICANT CHANGE UP (ref 3.5–5.3)
POTASSIUM SERPL-SCNC: 3.6 MMOL/L — SIGNIFICANT CHANGE UP (ref 3.5–5.3)
PROT SERPL-MCNC: 6.8 G/DL — SIGNIFICANT CHANGE UP (ref 6–8.3)
RBC # BLD: 4.45 M/UL — SIGNIFICANT CHANGE UP (ref 3.8–5.2)
RBC # FLD: 13.2 % — SIGNIFICANT CHANGE UP (ref 10.3–14.5)
SODIUM SERPL-SCNC: 140 MMOL/L — SIGNIFICANT CHANGE UP (ref 135–145)
WBC # BLD: 5.94 K/UL — SIGNIFICANT CHANGE UP (ref 3.8–10.5)
WBC # FLD AUTO: 5.94 K/UL — SIGNIFICANT CHANGE UP (ref 3.8–10.5)

## 2019-12-03 PROCEDURE — 82607 VITAMIN B-12: CPT

## 2019-12-03 PROCEDURE — 84100 ASSAY OF PHOSPHORUS: CPT

## 2019-12-03 PROCEDURE — 86803 HEPATITIS C AB TEST: CPT

## 2019-12-03 PROCEDURE — 93005 ELECTROCARDIOGRAM TRACING: CPT

## 2019-12-03 PROCEDURE — 85027 COMPLETE CBC AUTOMATED: CPT

## 2019-12-03 PROCEDURE — 80076 HEPATIC FUNCTION PANEL: CPT

## 2019-12-03 PROCEDURE — 99285 EMERGENCY DEPT VISIT HI MDM: CPT | Mod: 25

## 2019-12-03 PROCEDURE — 74183 MRI ABD W/O CNTR FLWD CNTR: CPT

## 2019-12-03 PROCEDURE — 83036 HEMOGLOBIN GLYCOSYLATED A1C: CPT

## 2019-12-03 PROCEDURE — 96375 TX/PRO/DX INJ NEW DRUG ADDON: CPT

## 2019-12-03 PROCEDURE — 36415 COLL VENOUS BLD VENIPUNCTURE: CPT

## 2019-12-03 PROCEDURE — 84443 ASSAY THYROID STIM HORMONE: CPT

## 2019-12-03 PROCEDURE — 83690 ASSAY OF LIPASE: CPT

## 2019-12-03 PROCEDURE — 82248 BILIRUBIN DIRECT: CPT

## 2019-12-03 PROCEDURE — 80061 LIPID PANEL: CPT

## 2019-12-03 PROCEDURE — 80074 ACUTE HEPATITIS PANEL: CPT

## 2019-12-03 PROCEDURE — 80048 BASIC METABOLIC PNL TOTAL CA: CPT

## 2019-12-03 PROCEDURE — 83735 ASSAY OF MAGNESIUM: CPT

## 2019-12-03 PROCEDURE — 76705 ECHO EXAM OF ABDOMEN: CPT

## 2019-12-03 PROCEDURE — 96374 THER/PROPH/DIAG INJ IV PUSH: CPT | Mod: XU

## 2019-12-03 PROCEDURE — 80053 COMPREHEN METABOLIC PANEL: CPT

## 2019-12-03 RX ADMIN — PANTOPRAZOLE SODIUM 40 MILLIGRAM(S): 20 TABLET, DELAYED RELEASE ORAL at 05:47

## 2019-12-03 NOTE — PROGRESS NOTE ADULT - CONSTITUTIONAL DETAILS
no distress/well-groomed/well-developed
well-developed/well-nourished/well-groomed/no distress
well-groomed/no distress/well-developed

## 2019-12-03 NOTE — PROGRESS NOTE ADULT - GASTROINTESTINAL DETAILS
no distention/no guarding/bowel sounds normal/nontender/soft/no rebound tenderness/no rigidity
no rigidity/no bruit/no rebound tenderness/no guarding/bowel sounds normal/soft/no distention/nontender
no distention/bowel sounds normal/no guarding/no rebound tenderness/no rigidity/nontender/soft

## 2019-12-03 NOTE — DISCHARGE NOTE NURSING/CASE MANAGEMENT/SOCIAL WORK - PATIENT PORTAL LINK FT
You can access the FollowMyHealth Patient Portal offered by [facility] by registering at the following website: [email or web address]. By joining [email or web address]’s FollowMyHealth portal, you will also be able to view your health information using other applications (apps) compatible with our system.

## 2019-12-03 NOTE — DISCHARGE NOTE PROVIDER - PROVIDER TOKENS
FREE:[LAST:[Brendon],FIRST:[Fede],PHONE:[phone]],FAX:[(   [phone]    -],ADDRESS:[[place] [address]],FOLLOWUP:[1 week]]

## 2019-12-03 NOTE — PROGRESS NOTE ADULT - NEUROLOGICAL DETAILS
responds to pain/responds to verbal commands/sensation intact/cranial nerves intact
responds to verbal commands/responds to pain/sensation intact
responds to verbal commands/responds to pain/sensation intact

## 2019-12-03 NOTE — DISCHARGE NOTE PROVIDER - NSDCCPCAREPLAN_GEN_ALL_CORE_FT
PRINCIPAL DISCHARGE DIAGNOSIS  Diagnosis: Pancreatic lesion  Assessment and Plan of Treatment: your MRI of abdomen showed unchanged pancreatic cystic lesion from 2/28/2017  If you have severe abdominal pain, please call your primary doctor  See your primary doctor on regular basis to get blood work done and  may need to have follow up MRI of abdomen to compare size of lesion      SECONDARY DISCHARGE DIAGNOSES  Diagnosis: Fatty liver  Assessment and Plan of Treatment: major risk factors have pre-diabetic type 2 as evidenced by HgA1C 6.1% on  admission.   you need blood work on regular basis  to check your liver enxzyme   stop drinking alcohol   try to avoid tylenol/ acetaminophen contains medications   when symptoms occur, such as fatigue, weight loss and abdominal pain please call primary doctor      Diagnosis: Hypokalemia  Assessment and Plan of Treatment: reolsved  likely due to poor P.O itake for abdominal pain  pleaser make an appointment with your primary physician to get follow up blood work

## 2019-12-03 NOTE — DISCHARGE NOTE PROVIDER - HOSPITAL COURSE
59 years old Female from home with PMH of RA, diverticulitis PSH of appendectomy and cholecystomy presents with upper abd pain x 2 days, worse with eating, associated with NBNB vomiting. Patient pain starts with epigastrium , 10/10 in intensity, intermittent and radiates to back. Patient is seen in ED 2 days ago with CT abdomen and pelvis showing lesion in pancreatic body. Symptoms returned today after eating so patient came to ED. Admitted to medicine for Abd. pain. MRCP was performed, unchanged pancreatic lesion from 2/28/2017. Abd sono with fatty liver, mild postcholecystectomy biliary dilatation noted    Tolerating diet, LFTs downtrending , hepatitis panels were negative.         (TO BE COMPLETED) 59 years old Female from home with PMH of RA, diverticulitis PSH of appendectomy and cholecystomy presents with upper abd pain x 2 days, worse with eating, associated with NBNB vomiting. Patient pain starts with epigastrium , 10/10 in intensity, intermittent and radiates to back. Patient is seen in ED 2 days ago with CT abdomen and pelvis showing lesion in pancreatic body. Symptoms returned today after eating so patient came to ED. Admitted to medicine for Abd. pain.  GI Dr. Monroe consulted, MRCP was performed, unchanged pancreatic lesion from 2/28/2017. Abd sono with fatty liver, mild postcholecystectomy biliary dilatation noted.     Tolerating  regular diet, LFTs downtrending , hepatitis panels were negative.      Pt will see her PMD within 1 week after discharge .    Patient is medically stable for discharge.

## 2019-12-03 NOTE — DISCHARGE NOTE PROVIDER - NSDCMRMEDTOKEN_GEN_ALL_CORE_FT
diclofenac:  orally   esomeprazole 20 mg oral delayed release capsule: 1 cap(s) orally once a day   ondansetron 4 mg oral tablet: 1 tab(s) orally every 8 hours, As Needed for nausea esomeprazole 20 mg oral delayed release capsule: 1 cap(s) orally once a day

## 2019-12-03 NOTE — PROGRESS NOTE ADULT - RS GEN PE MLT RESP DETAILS PC
no rales/good air movement/no rhonchi/airway patent/breath sounds equal
respirations non-labored/good air movement/no wheezes/no rales/airway patent/breath sounds equal
airway patent/good air movement/no wheezes/breath sounds equal

## 2019-12-03 NOTE — PROGRESS NOTE ADULT - NEGATIVE ENMT SYMPTOMS
no hearing difficulty/no ear pain/no gum bleeding/no dry mouth/no nose bleeds/no throat pain/no dysphagia

## 2019-12-03 NOTE — PROGRESS NOTE ADULT - SUBJECTIVE AND OBJECTIVE BOX
[   ] ICU                                          [   ] CCU                                      [  X ] Medical Floor (patient seen and examined earlier in the morning)      Patient is comfortable. No new complaints reported, No abdominal pain, N/V, hematemesis, hematochezia, melena, fever, chills, chest pain, SOB, cough or diarrhea reported.    VITALS  Vital Signs Last 24 Hrs  T(C): 36.9 (03 Dec 2019 14:05), Max: 36.9 (03 Dec 2019 14:05)  T(F): 98.5 (03 Dec 2019 14:05), Max: 98.5 (03 Dec 2019 14:05)  HR: 76 (03 Dec 2019 14:05) (63 - 76)  BP: 122/72 (03 Dec 2019 14:05) (100/63 - 125/68)   RR: 16 (03 Dec 2019 14:05) (16 - 17)  SpO2: 99% (03 Dec 2019 14:05) (97% - 99%)       MEDICATIONS  (STANDING):  influenza   Vaccine 0.5 milliLiter(s) IntraMuscular once  pantoprazole    Tablet 40 milliGRAM(s) Oral before breakfast  sodium chloride 0.9%. 1000 milliLiter(s) (70 mL/Hr) IV Continuous <Continuous>  sodium chloride 0.9%. 1000 milliLiter(s) (70 mL/Hr) IV Continuous <Continuous>    MEDICATIONS  (PRN):  metoclopramide Injectable 10 milliGRAM(s) IV Push every 6 hours PRN nausea, vomit  ondansetron Injectable 4 milliGRAM(s) IV Push every 8 hours PRN Nausea and/or Vomiting                            12.6   5.94  )-----------( 264      ( 03 Dec 2019 06:02 )             40.2       12-03    140  |  107  |  6<L>  ----------------------------<  96  3.6   |  28  |  0.64    Ca    9.0      03 Dec 2019 06:02    TPro  6.8  /  Alb  3.3<L>  /  TBili  0.5  /  DBili  0.2  /  AST  80<H>  /  ALT  463<H>  /  AlkPhos  238<H>  12-03      EXAM:  MR ABDOMEN WAW IC                            PROCEDURE DATE:  12/01/2019          INTERPRETATION:  FINAL REPORT:    PROCEDURE INFORMATION:   Exam: MR Abdomen Without and With Contrast   Exam date and time: 12/1/2019 10:03 AM   Age: 59 years old   Clinical history: Epigastric pain. History of diverticulitis,   cholecystectomy.    TECHNIQUE:   Imaging protocol: MR of the abdomen without and with intravenous   contrast.   3D rendering: MIP reconstructed images were created and reviewed.   Contrast material: GADAVIST; Contrast volume: 6.5 ml; Contrast route: IV;     COMPARISON:   Abdominal ultrasound 11/30/2019 and CT abdomen/pelvis 11/27/2019    FINDINGS:   Liver: Fatty infiltration in the right hepatic lobe. No mass.  Gallbladder and bile ducts: Cholecystectomy. No biliary ductal dilatation   or common bile duct stone.  Pancreas: 1.1 cm cystic lesion in the pancreatic body (series 4 image   11), unchanged since 3/23/2017, slightly increased in size since   1/28/2014, previously 8 mm. No evidence of communication with the   pancreatic duct. Mild peripancreatic fluid and edema involving the   pancreatic head.  Spleen: Normal size. No mass.  Adrenals: Unremarkable.  Kidneys and ureters: Slightly malrotated right kidney. No hydronephrosis   or mass.    Stomach and bowel: Colonic diverticulosis. Bowel loops normal in caliber.  Intraperitoneal space: Small amount of ascites.  Vessels: Abdominal aorta normal in caliber. Portal, splenic, and superior   mesenteric veins are patent. IVC and hepatic veins are patent. Hepatic   artery has a separate origin directly from the abdominal aorta, an   anatomic variant.  Abdominal wall: Unremarkable.  Bones: No aggressive osseous lesion.    IMPRESSION:     1.1 cm cystic lesion within the pancreatic body, unchanged since   2/28/2017, slightly increased in size since 1/28/2014; continued   follow-up is recommended with a pre and post IV contrast MRI/MRCP of the   abdomen one year.    Mild peripancreatic edema and fluid involving the pancreatic head;   clinical correlation is recommended for pancreatitis.

## 2019-12-03 NOTE — PROGRESS NOTE ADULT - SUBJECTIVE AND OBJECTIVE BOX
Patient is a 59y old  Female who presents with a chief complaint of abdominal pain, nausea and vomiting. (03 Dec 2019 04:54)      pt seen in icu [  ], reg med floor [ x  ], bed [ x ], chair at bedside [   ], a+o x3 [ x ], lethargic [  ],  nad [ x ]      Allergies    No Known Allergies        Vitals    T(F): 98 (19 @ 04:41), Max: 98.6 (19 @ 14:00)  HR: 63 (19 @ 04:41) (63 - 72)  BP: 100/63 (19 @ 04:41) (100/63 - 125/68)  RR: 16 (19 @ 04:41) (16 - 17)  SpO2: 97% (19 @ 04:41) (97% - 97%)  Wt(kg): --  CAPILLARY BLOOD GLUCOSE          Labs                          12.6   5.94  )-----------( 264      ( 03 Dec 2019 06:02 )             40.2           140  |  107  |  6<L>  ----------------------------<  96  3.6   |  28  |  0.64    Ca    9.0      03 Dec 2019 06:02    TPro  6.8  /  Alb  3.3<L>  /  TBili  0.5  /  DBili  0.2  /  AST  80<H>  /  ALT  463<H>  /  AlkPhos  238<H>                  Radiology Results      Meds    MEDICATIONS  (STANDING):  influenza   Vaccine 0.5 milliLiter(s) IntraMuscular once  pantoprazole    Tablet 40 milliGRAM(s) Oral before breakfast  sodium chloride 0.9%. 1000 milliLiter(s) (70 mL/Hr) IV Continuous <Continuous>  sodium chloride 0.9%. 1000 milliLiter(s) (70 mL/Hr) IV Continuous <Continuous>      MEDICATIONS  (PRN):  metoclopramide Injectable 10 milliGRAM(s) IV Push every 6 hours PRN nausea, vomit  ondansetron Injectable 4 milliGRAM(s) IV Push every 8 hours PRN Nausea and/or Vomiting      Physical Exam      Neuro :  no focal deficits  Respiratory: CTA B/L  CV: RRR, S1S2, no murmurs,   Abdominal: Soft, mild epigastric tender to moderate palp, ND +BS,  Extremities: No edema, + peripheral pulses        ASSESSMENT    epigastric pain likely 2nd to acute pancreatitis,   possible gastritis,   billary obstruction r/o,   viral hepatitis r/o,   h/o Osteoporosis  RA (rheumatoid arthritis)  Diverticulitis  No pertinent past medical history  History of appendectomy  History of cholecystectomy  H/O:       PLAN      cont protonix  mri abd w/wo contrast with 1.1 cm cystic lesion within the pancreatic body, unchanged since 2017, Mild peripancreatic edema and fluid involving the pancreatic head;   clinical correlation is recommended for pancreatitis noted above.  abd sono with Fatty liver. Mild postcholecystectomy biliary dilatation noted above  gi f/u.   acute hepatitis panel neg noted above  f/u lft  cont current meds Patient is a 59y old  Female who presents with a chief complaint of abdominal pain, nausea and vomiting. (03 Dec 2019 04:54)      pt seen in icu [  ], reg med floor [ x  ], bed [ x ], chair at bedside [   ], a+o x3 [ x ], lethargic [  ],  nad [ x ]    pt states is feeling better     Allergies    No Known Allergies        Vitals    T(F): 98 (19 @ 04:41), Max: 98.6 (19 @ 14:00)  HR: 63 (19 @ 04:41) (63 - 72)  BP: 100/63 (19 @ 04:41) (100/63 - 125/68)  RR: 16 (19 @ 04:41) (16 - 17)  SpO2: 97% (19 @ 04:41) (97% - 97%)  Wt(kg): --  CAPILLARY BLOOD GLUCOSE          Labs                          12.6   5.94  )-----------( 264      ( 03 Dec 2019 06:02 )             40.2           140  |  107  |  6<L>  ----------------------------<  96  3.6   |  28  |  0.64    Ca    9.0      03 Dec 2019 06:02    TPro  6.8  /  Alb  3.3<L>  /  TBili  0.5  /  DBili  0.2  /  AST  80<H>  /  ALT  463<H>  /  AlkPhos  238<H>                  Radiology Results      Meds    MEDICATIONS  (STANDING):  influenza   Vaccine 0.5 milliLiter(s) IntraMuscular once  pantoprazole    Tablet 40 milliGRAM(s) Oral before breakfast  sodium chloride 0.9%. 1000 milliLiter(s) (70 mL/Hr) IV Continuous <Continuous>  sodium chloride 0.9%. 1000 milliLiter(s) (70 mL/Hr) IV Continuous <Continuous>      MEDICATIONS  (PRN):  metoclopramide Injectable 10 milliGRAM(s) IV Push every 6 hours PRN nausea, vomit  ondansetron Injectable 4 milliGRAM(s) IV Push every 8 hours PRN Nausea and/or Vomiting      Physical Exam      Neuro :  no focal deficits  Respiratory: CTA B/L  CV: RRR, S1S2, no murmurs,   Abdominal: Soft, nt, ND +BS,  Extremities: No edema, + peripheral pulses        ASSESSMENT    epigastric pain likely 2nd to acute pancreatitis,   possible gastritis,   billary obstruction r/o,   viral hepatitis r/o,   h/o Osteoporosis  RA (rheumatoid arthritis)  Diverticulitis  No pertinent past medical history  History of appendectomy  History of cholecystectomy  H/O:       PLAN      cont protonix  mri abd w/wo contrast with 1.1 cm cystic lesion within the pancreatic body, unchanged since 2017, Mild peripancreatic edema and fluid involving the pancreatic head;   clinical correlation is recommended for pancreatitis noted above.  abd sono with Fatty liver. Mild postcholecystectomy biliary dilatation noted above  gi f/u.   acute hepatitis panel neg noted above  LFTs improving   cont current meds

## 2019-12-03 NOTE — PROGRESS NOTE ADULT - REASON FOR ADMISSION
abdominal pain, nausea and vomiting.

## 2019-12-03 NOTE — PROGRESS NOTE ADULT - ASSESSMENT
1. Abdominal pain  2. Elevated LFT's  3. R/o Retain CBD stone  4. R/o acute viral hepatitis    B. Pancreatic lesion  1. R/o pancreatic ca    Suggestions:    1. NPO  2. IVF hydration  3. MRCP  4. Follow up LFT's  5. Check hepatitis serology for A, B and C  6. Protonix daily  7. Avoid NSAID  8. DVT prophylaxis
1. Abdominal pain  2. Elevated LFT's  3. R/o Retain CBD stone  4. R/o acute viral hepatitis    B. Pancreatic lesion  1. R/o pancreatic ca    Suggestions:    1. NPO  2. IVF hydration  3. Protonix 40mg daily  4. Follow up LFT's  5. Avoid NSAID  6. DVT prophylaxis
1. Pancreatitis improving  2. Elevated LFT's improving  3. Abdominal pain improvingR/o Retain CBD stone  4. Pancreatic cystic  lesion Unchanged       Suggestions:    1. Advance diet as tolerated  2. Repeat MRI in one year  3. Protonix 40mg daily  4. Follow up LFT's  5. Avoid NSAID  6. DVT prophylaxis

## 2020-06-09 NOTE — PROGRESS NOTE ADULT - GENERAL
details… High Dose Vitamin A Pregnancy And Lactation Text: High dose vitamin A therapy is contraindicated during pregnancy and breast feeding.

## 2020-06-28 NOTE — ED ADULT TRIAGE NOTE - RESPIRATORY RATE (BREATHS/MIN)
-had shingles to his L chest and back along with sudden hearing loss about 3 weeks ago, finished 1 week of antiviral course and also has been seeing ENT   -completed course of prednisone for hearing loss as well    -refused skin exam on admission 18

## 2020-11-18 ENCOUNTER — APPOINTMENT (OUTPATIENT)
Dept: OTOLARYNGOLOGY | Facility: CLINIC | Age: 60
End: 2020-11-18
Payer: COMMERCIAL

## 2020-11-18 VITALS
DIASTOLIC BLOOD PRESSURE: 87 MMHG | WEIGHT: 135 LBS | TEMPERATURE: 97.7 F | HEIGHT: 61 IN | SYSTOLIC BLOOD PRESSURE: 134 MMHG | BODY MASS INDEX: 25.49 KG/M2 | HEART RATE: 82 BPM

## 2020-11-18 PROCEDURE — 69220 CLEAN OUT MASTOID CAVITY: CPT | Mod: RT

## 2020-11-18 PROCEDURE — 99214 OFFICE O/P EST MOD 30 MIN: CPT | Mod: 25

## 2020-11-18 NOTE — PROCEDURE
[FreeTextEntry3] : Procedure - Cerumen Removal. \par After informed verbal consent is obtained, cerumen is removed from the right ear canal and mastoid bowl with suction.  Mastoid bowl clear, dry with intact TM graft.\par

## 2020-11-18 NOTE — HISTORY OF PRESENT ILLNESS
[de-identified] : s/p Rt. Tympanomastoidectomy w OCR about 10 years ago with Dr. Cramer\par - Now she notes Itching in b/l ears, R > L.  Hearing is good.  No pain.  No d/c, no Tinnitus or Vertigo.

## 2020-11-18 NOTE — ASSESSMENT
[FreeTextEntry1] : S/P Right Tympanomastoidectomy \par - Mastoid bowl debrided today\par - F/U 6 months to redebride

## 2020-11-18 NOTE — PHYSICAL EXAM
[Normal] : mucosa is normal [Midline] : trachea located in midline position [de-identified] : Right mastoid bowl with debris and cerumen.  Left normal

## 2021-08-27 ENCOUNTER — APPOINTMENT (OUTPATIENT)
Dept: OTOLARYNGOLOGY | Facility: CLINIC | Age: 61
End: 2021-08-27
Payer: MEDICAID

## 2021-08-27 VITALS
HEART RATE: 68 BPM | WEIGHT: 135 LBS | HEIGHT: 61 IN | SYSTOLIC BLOOD PRESSURE: 121 MMHG | BODY MASS INDEX: 25.49 KG/M2 | TEMPERATURE: 98.2 F | DIASTOLIC BLOOD PRESSURE: 82 MMHG

## 2021-08-27 DIAGNOSIS — J31.0 CHRONIC RHINITIS: ICD-10-CM

## 2021-08-27 DIAGNOSIS — J34.2 DEVIATED NASAL SEPTUM: ICD-10-CM

## 2021-08-27 DIAGNOSIS — H70.11 CHRONIC MASTOIDITIS, RIGHT EAR: ICD-10-CM

## 2021-08-27 DIAGNOSIS — H61.21 IMPACTED CERUMEN, RIGHT EAR: ICD-10-CM

## 2021-08-27 DIAGNOSIS — R42 DIZZINESS AND GIDDINESS: ICD-10-CM

## 2021-08-27 DIAGNOSIS — H69.81 OTHER SPECIFIED DISORDERS OF EUSTACHIAN TUBE, RIGHT EAR: ICD-10-CM

## 2021-08-27 PROCEDURE — 92504 EAR MICROSCOPY EXAMINATION: CPT | Mod: RT,59

## 2021-08-27 PROCEDURE — 69220 CLEAN OUT MASTOID CAVITY: CPT | Mod: RT

## 2021-08-27 PROCEDURE — 99213 OFFICE O/P EST LOW 20 MIN: CPT | Mod: 25

## 2021-08-28 NOTE — PHYSICAL EXAM
[Midline] : trachea located in midline position [de-identified] : Right mastoid bowl with debris.  Left clear [Normal] : no rashes

## 2021-08-28 NOTE — HISTORY OF PRESENT ILLNESS
[de-identified] : 60 y/o F s/p Rt. tympanomastoidectomy with OCR about 11 y/a.  Now with sensation of vertigo when laying down and turning in bed.  Notes occurs turning to both the left and Right.  Spinning sensation lasts only a few seconds.  Occasional ear discomfort.  Feels hearing is good.

## 2021-08-28 NOTE — ASSESSMENT
[FreeTextEntry1] : S/P Right Tympanomastoidectomy with OCR, now with vertigo due to mastoid debris\par - Right Mastoid bowl debrided today in office\par - F/U 6 months for debridement. and prn

## 2021-08-28 NOTE — PROCEDURE
[FreeTextEntry3] : Procedure - Cerumen Removal. \par After informed verbal consent is obtained, cerumen is removed from the right ear canal / mastoid bowl with a suction.   Clean Mastoid bowl following removal. \par  [Risk and Benefits Discussed] : The purpose, risks, discomforts, benefits and alternatives of the procedure have been explained to the patient including no treatment. [Cerumen Impaction] : Cerumen Impaction [Same] : same as the Pre Op Dx. [] : Removal of Cerumen [FreeTextEntry1] : right chr mastoiditis [FreeTextEntry6] : wax removed\par the microscope was necessary for illumination and magnification\par right mastoid debrided w/ suction and keyon

## 2021-11-15 NOTE — ED ADULT NURSE NOTE - OBJECTIVE STATEMENT
pt is here with  abdominla pain and nausea tender to touch Tetracycline Counseling: Patient counseled regarding possible photosensitivity and increased risk for sunburn.  Patient instructed to avoid sunlight, if possible.  When exposed to sunlight, patients should wear protective clothing, sunglasses, and sunscreen.  The patient was instructed to call the office immediately if the following severe adverse effects occur:  hearing changes, easy bruising/bleeding, severe headache, or vision changes.  The patient verbalized understanding of the proper use and possible adverse effects of tetracycline.  All of the patient's questions and concerns were addressed. Patient understands to avoid pregnancy while on therapy due to potential birth defects.

## 2022-04-14 NOTE — PROGRESS NOTE ADULT - CARDIOVASCULAR
Cimzia Counseling:  I discussed with the patient the risks of Cimzia including but not limited to immunosuppression, allergic reactions and infections.  The patient understands that monitoring is required including a PPD at baseline and must alert us or the primary physician if symptoms of infection or other concerning signs are noted. details… detailed exam

## 2022-06-03 NOTE — PROGRESS NOTE ADULT - PROVIDER SPECIALTY LIST ADULT
Internal Medicine [de-identified] : 33 year old female presents for evaluation of a right ankle injury sustained one week ago while running on the beach in the [place]. She was able to continue running but with discomfort. She reports that the pain worsened while flying home that night. She was seen the following morning in the ED at [facility]. Xrays were negative for fracture. She complains of pain over the lateral aspect of the ankle. She is taking ibuprofen for pain relief. Denies history of prior ankle problems.

## 2022-08-08 NOTE — PROGRESS NOTE ADULT - RESPIRATORY AND THORAX
details… Denies Fx Hx/none Medical Necessity Clause: This procedure was medically necessary because the lesion that was treated was:

## 2022-11-15 NOTE — ED ADULT NURSE NOTE - PRO INTERPRETER NEED 2
Subjective:       Patient ID: Naila Galeas is a 31 y.o. female.    Chief Complaint: Flank Pain (Right sided flank pain that radiates into right lower abdomen. ) and Dysuria    Flank Pain (Right sided flank pain that radiates into right lower abdomen. )  Review of Systems   Constitutional:  Negative for chills, fatigue and fever.   Genitourinary:  Positive for dysuria and flank pain. Negative for frequency, genital sores and urgency.       Objective:      Physical Exam  Constitutional:       General: She is not in acute distress.     Appearance: Normal appearance. She is normal weight. She is not ill-appearing, toxic-appearing or diaphoretic.   Eyes:      Pupils: Pupils are equal, round, and reactive to light.   Cardiovascular:      Rate and Rhythm: Normal rate and regular rhythm.      Pulses: Normal pulses.      Heart sounds: Normal heart sounds. No murmur heard.  Pulmonary:      Effort: Pulmonary effort is normal.      Breath sounds: Normal breath sounds. No wheezing or rhonchi.   Abdominal:      General: Bowel sounds are normal.      Palpations: Abdomen is soft.      Tenderness: There is abdominal tenderness in the right lower quadrant, suprapubic area and left lower quadrant. There is no right CVA tenderness, left CVA tenderness, guarding or rebound.      Comments: Mildly TTP- abundant stool noted on KUB   Skin:     General: Skin is warm and dry.      Coloration: Skin is not jaundiced.      Findings: No rash.   Neurological:      Mental Status: She is alert and oriented to person, place, and time.      Gait: Gait normal.   Psychiatric:         Mood and Affect: Mood normal.         Behavior: Behavior normal.          Assessment:       1. Right flank pain    2. Dysuria    3. Abdominal pain, unspecified abdominal location        Plan:   Right flank pain  -     POCT urine pregnancy  -     X-Ray KUB; Future; Expected date: 11/15/2022    Dysuria  -     POCT URINALYSIS W/O SCOPE  -     Urine culture; Future; Expected  date: 11/15/2022    Abdominal pain, unspecified abdominal location  -     CBC Auto Differential; Future; Expected date: 11/15/2022  -     Comprehensive Metabolic Panel; Future; Expected date: 11/15/2022     Labs, KUB, UA, Urine cult  Dulcolax OTC  If pain persist we will do a CT of abdomen     English

## 2023-01-06 NOTE — ED ADULT NURSE NOTE - CAS TRG GEN SKIN COLOR
Scheduled procedure with Patient at      Telephone Information:   Mobile [phone]      Scheduled Via: Case Request Order for  [facility]  Procedure date: 1-24  Procedure time:0845  Rep Contacted?: No  Entered into MD's Irene/Palm? Yes  Insurance confirmed as [employer], will be the same at time of procedure?: Yes  Letter sent via [employer] [facility] Yes  Is this a STAAR PT? No    The following have been confirmed:  Latex Allergy No  Diabetic No  Sleep Apnea No  Diuretic/Water pill Yes  Defibrillator/Pacemaker No  MRSA hx No  Blood thinners: Coumadin (Warfarin) or Plavix No      Aspirin No      Phentermine (diet pill) No  Pre-Op testing required No, Patient informed No  Prep required? No; Briefly reviewed? No   Normal for race

## 2023-03-14 NOTE — ED PROVIDER NOTE - DISCHARGE DATE
05-Feb-2019 Drysol Counseling:  I discussed with the patient the risks of drysol/aluminum chloride including but not limited to skin rash, itching, irritation, burning.

## 2023-11-08 ENCOUNTER — APPOINTMENT (OUTPATIENT)
Dept: OTOLARYNGOLOGY | Facility: CLINIC | Age: 63
End: 2023-11-08

## 2023-11-09 ENCOUNTER — APPOINTMENT (OUTPATIENT)
Dept: OTOLARYNGOLOGY | Facility: CLINIC | Age: 63
End: 2023-11-09

## 2024-07-29 NOTE — ED PROVIDER NOTE - NEUROLOGICAL, MLM
LM for patient to call the office.   Alert and oriented, no focal deficits, no motor or sensory deficits.

## 2024-11-14 ENCOUNTER — APPOINTMENT (OUTPATIENT)
Dept: OTOLARYNGOLOGY | Facility: CLINIC | Age: 64
End: 2024-11-14
Payer: MEDICAID

## 2024-11-14 ENCOUNTER — NON-APPOINTMENT (OUTPATIENT)
Age: 64
End: 2024-11-14

## 2024-11-14 VITALS
SYSTOLIC BLOOD PRESSURE: 116 MMHG | DIASTOLIC BLOOD PRESSURE: 72 MMHG | BODY MASS INDEX: 26.43 KG/M2 | WEIGHT: 140 LBS | HEIGHT: 61 IN | HEART RATE: 80 BPM

## 2024-11-14 DIAGNOSIS — H61.21 IMPACTED CERUMEN, RIGHT EAR: ICD-10-CM

## 2024-11-14 DIAGNOSIS — J34.2 DEVIATED NASAL SEPTUM: ICD-10-CM

## 2024-11-14 DIAGNOSIS — H70.11 CHRONIC MASTOIDITIS, RIGHT EAR: ICD-10-CM

## 2024-11-14 PROCEDURE — 99203 OFFICE O/P NEW LOW 30 MIN: CPT | Mod: 25

## 2024-11-14 PROCEDURE — 69220 CLEAN OUT MASTOID CAVITY: CPT | Mod: RT

## 2024-11-14 PROCEDURE — 92504 EAR MICROSCOPY EXAMINATION: CPT | Mod: RT

## 2024-11-14 NOTE — HISTORY OF PRESENT ILLNESS
[de-identified] : 65 y/o F s/p Rt. tympanomastoidectomy with OCR .  Now with ear popping x several days no other modifying factors no other nasal or throat complaints

## 2024-11-14 NOTE — PROCEDURE
[FreeTextEntry3] : Procedure - Cerumen Removal. \par  After informed verbal consent is obtained, cerumen is removed from the right ear canal / mastoid bowl with a suction.   Clean Mastoid bowl following removal. \par   [Risk and Benefits Discussed] : The purpose, risks, discomforts, benefits and alternatives of the procedure have been explained to the patient including no treatment. [Cerumen Impaction] : Cerumen Impaction [Same] : same as the Pre Op Dx. [] : Removal of Cerumen [FreeTextEntry1] : right chr mastoiditis [FreeTextEntry6] : wax removed\par  the microscope was necessary for illumination and magnification\par  right mastoid debrided w/ suction and sj

## 2024-11-14 NOTE — PHYSICAL EXAM
[de-identified] : Right mastoid bowl with debris.  Left clear [Midline] : trachea located in midline position [Normal] : no rashes

## 2025-03-05 ENCOUNTER — APPOINTMENT (OUTPATIENT)
Dept: OTOLARYNGOLOGY | Facility: CLINIC | Age: 65
End: 2025-03-05
Payer: MEDICAID

## 2025-03-05 DIAGNOSIS — H61.21 IMPACTED CERUMEN, RIGHT EAR: ICD-10-CM

## 2025-03-05 DIAGNOSIS — H69.91 UNSPECIFIED EUSTACHIAN TUBE DISORDER, RIGHT EAR: ICD-10-CM

## 2025-03-05 DIAGNOSIS — H70.11 CHRONIC MASTOIDITIS, RIGHT EAR: ICD-10-CM

## 2025-03-05 PROCEDURE — 99213 OFFICE O/P EST LOW 20 MIN: CPT | Mod: 25

## 2025-03-05 PROCEDURE — 69210 REMOVE IMPACTED EAR WAX UNI: CPT | Mod: RT

## 2025-03-05 NOTE — REASON FOR VISIT
[Subsequent Evaluation] : a subsequent evaluation for [FreeTextEntry2] : F/u Hx of Right tympanomastoidectomy with OCR.

## 2025-03-05 NOTE — END OF VISIT
[FreeTextEntry3] : I personally saw and examined ANA M CALLAWAY in detail.I have made changes in changes in the body of the note where appropriate. I personally reviewed the HPI, PMH, FH, SH, ROS and medications/allergies. I have spoken to ESTELA Moore regarding the history and have personally determined the assessment and plan of care and documented this myself.. I performed all procedures and performed the physical exam. I have made changes in the body of the note where appropriate.   Attesting Faculty: See Attending Signature Below

## 2025-03-05 NOTE — PROCEDURE
[FreeTextEntry3] : Procedure - Cerumen Removal. Indication - Obstructing visualization of TM After informed verbal consent is obtained, cerumen was removed from the right ear canal(s) with a suction.  Normal appearing canal(s) following removal.

## 2025-03-05 NOTE — PHYSICAL EXAM
[Midline] : trachea located in midline position [de-identified] : Cerumen on right.  Left WNL [de-identified] : Right TM post op changes-no cholesteatoma [Normal] : no rashes

## 2025-03-05 NOTE — HISTORY OF PRESENT ILLNESS
[de-identified] : 63 y/o F with Hx of Right tympanomastoidectomy and OCR. Here today for f/u.  notes hearing is good and unchanged.  No pain, tinnitus, vertigo.  No ear d/c.  No nasal congestion or sinus pain or pressure.  no other modifying factors no other nasal or throat complaints

## 2025-05-14 NOTE — ED PROVIDER NOTE - PROGRESS NOTE DETAILS
18:44 D/w Dr. Gunter, fluid secondary to diverticulitis noted on pelvic imaging. Educated pt on diet, abx, GI/PMD f/u. Answered q's.

## 2025-09-10 ENCOUNTER — APPOINTMENT (OUTPATIENT)
Dept: OTOLARYNGOLOGY | Facility: CLINIC | Age: 65
End: 2025-09-10